# Patient Record
Sex: MALE | Race: OTHER | HISPANIC OR LATINO | Employment: UNEMPLOYED | URBAN - METROPOLITAN AREA
[De-identification: names, ages, dates, MRNs, and addresses within clinical notes are randomized per-mention and may not be internally consistent; named-entity substitution may affect disease eponyms.]

---

## 2021-09-22 ENCOUNTER — HOSPITAL ENCOUNTER (EMERGENCY)
Facility: HOSPITAL | Age: 31
Discharge: HOME | End: 2021-09-22
Attending: EMERGENCY MEDICINE

## 2021-09-22 VITALS
SYSTOLIC BLOOD PRESSURE: 125 MMHG | RESPIRATION RATE: 18 BRPM | DIASTOLIC BLOOD PRESSURE: 65 MMHG | HEART RATE: 61 BPM | WEIGHT: 135 LBS | OXYGEN SATURATION: 98 % | BODY MASS INDEX: 23.92 KG/M2 | HEIGHT: 63 IN | TEMPERATURE: 98 F

## 2021-09-22 DIAGNOSIS — R07.9 CHEST PAIN: ICD-10-CM

## 2021-09-22 DIAGNOSIS — T14.8XXA MUSCLE STRAIN: Primary | ICD-10-CM

## 2021-09-22 PROCEDURE — 99283 EMERGENCY DEPT VISIT LOW MDM: CPT | Mod: 25

## 2021-09-22 PROCEDURE — 25000003 PHARM REV CODE 250: Performed by: EMERGENCY MEDICINE

## 2021-09-22 RX ADMIN — LIDOCAINE HYDROCHLORIDE: 20 SOLUTION ORAL; TOPICAL at 03:09

## 2021-10-12 ENCOUNTER — TELEPHONE (OUTPATIENT)
Dept: ADMINISTRATIVE | Facility: OTHER | Age: 31
End: 2021-10-12

## 2022-05-20 ENCOUNTER — HOSPITAL ENCOUNTER (EMERGENCY)
Facility: HOSPITAL | Age: 32
Discharge: HOME | End: 2022-05-20
Attending: EMERGENCY MEDICINE
Payer: COMMERCIAL

## 2022-05-20 VITALS
RESPIRATION RATE: 20 BRPM | SYSTOLIC BLOOD PRESSURE: 99 MMHG | BODY MASS INDEX: 23.17 KG/M2 | HEIGHT: 64 IN | HEART RATE: 77 BPM | TEMPERATURE: 99 F | OXYGEN SATURATION: 97 % | DIASTOLIC BLOOD PRESSURE: 56 MMHG

## 2022-05-20 DIAGNOSIS — M48.02 CERVICAL STENOSIS OF SPINAL CANAL: ICD-10-CM

## 2022-05-20 DIAGNOSIS — R91.1 PULMONARY NODULE: ICD-10-CM

## 2022-05-20 DIAGNOSIS — F10.920 ALCOHOLIC INTOXICATION WITHOUT COMPLICATION: ICD-10-CM

## 2022-05-20 DIAGNOSIS — R93.89 ABNORMAL CT OF THE CHEST: ICD-10-CM
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
## 2022-05-20 LAB
ABO + RH BLD: NORMAL
ALBUMIN SERPL BCP-MCNC: 4.1 G/DL (ref 3.5–5.2)
ALP SERPL-CCNC: 54 U/L (ref 55–135)
ALT SERPL W/O P-5'-P-CCNC: 32 U/L (ref 10–44)
ANION GAP SERPL CALC-SCNC: 10 MMOL/L (ref 8–16)
AST SERPL-CCNC: 31 U/L (ref 10–40)
BASOPHILS # BLD AUTO: 0.02 K/UL (ref 0–0.2)
BASOPHILS NFR BLD: 0.2 % (ref 0–1.9)
BILIRUB SERPL-MCNC: 0.6 MG/DL (ref 0.1–1)
BLD GP AB SCN CELLS X3 SERPL QL: NORMAL
BUN SERPL-MCNC: 11 MG/DL (ref 6–20)
CALCIUM SERPL-MCNC: 8.7 MG/DL (ref 8.7–10.5)
CHLORIDE SERPL-SCNC: 110 MMOL/L (ref 95–110)
CO2 SERPL-SCNC: 21 MMOL/L (ref 23–29)
CREAT SERPL-MCNC: 0.6 MG/DL (ref 0.5–1.4)
DIFFERENTIAL METHOD: ABNORMAL
EOSINOPHIL # BLD AUTO: 0 K/UL (ref 0–0.5)
EOSINOPHIL NFR BLD: 0.4 % (ref 0–8)
ERYTHROCYTE [DISTWIDTH] IN BLOOD BY AUTOMATED COUNT: 12.6 % (ref 11.5–14.5)
EST. GFR  (AFRICAN AMERICAN): >60 ML/MIN/1.73 M^2
EST. GFR  (NON AFRICAN AMERICAN): >60 ML/MIN/1.73 M^2
ETHANOL SERPL-MCNC: 80 MG/DL
GLUCOSE SERPL-MCNC: 95 MG/DL (ref 70–110)
HCT VFR BLD AUTO: 41.4 % (ref 40–54)
HGB BLD-MCNC: 14.3 G/DL (ref 14–18)
IMM GRANULOCYTES # BLD AUTO: 0.03 K/UL (ref 0–0.04)
IMM GRANULOCYTES NFR BLD AUTO: 0.3 % (ref 0–0.5)
LYMPHOCYTES # BLD AUTO: 1.1 K/UL (ref 1–4.8)
LYMPHOCYTES NFR BLD: 12 % (ref 18–48)
MCH RBC QN AUTO: 30.8 PG (ref 27–31)
MCHC RBC AUTO-ENTMCNC: 34.5 G/DL (ref 32–36)
MCV RBC AUTO: 89 FL (ref 82–98)
MONOCYTES # BLD AUTO: 0.5 K/UL (ref 0.3–1)
MONOCYTES NFR BLD: 5 % (ref 4–15)
NEUTROPHILS # BLD AUTO: 7.8 K/UL (ref 1.8–7.7)
NEUTROPHILS NFR BLD: 82.1 % (ref 38–73)
NRBC BLD-RTO: 0 /100 WBC
PLATELET # BLD AUTO: 305 K/UL (ref 150–450)
PMV BLD AUTO: 9.7 FL (ref 9.2–12.9)
POTASSIUM SERPL-SCNC: 3.8 MMOL/L (ref 3.5–5.1)
PROT SERPL-MCNC: 7.4 G/DL (ref 6–8.4)
RBC # BLD AUTO: 4.64 M/UL (ref 4.6–6.2)
SODIUM SERPL-SCNC: 141 MMOL/L (ref 136–145)
WBC # BLD AUTO: 9.49 K/UL (ref 3.9–12.7)

## 2022-05-20 PROCEDURE — 82077 ASSAY SPEC XCP UR&BREATH IA: CPT | Performed by: EMERGENCY MEDICINE

## 2022-05-20 PROCEDURE — 86901 BLOOD TYPING SEROLOGIC RH(D): CPT | Performed by: EMERGENCY MEDICINE

## 2022-05-20 PROCEDURE — 99284 EMERGENCY DEPT VISIT MOD MDM: CPT | Mod: ,,, | Performed by: EMERGENCY MEDICINE

## 2022-05-20 PROCEDURE — 80053 COMPREHEN METABOLIC PANEL: CPT | Performed by: EMERGENCY MEDICINE

## 2022-05-20 PROCEDURE — 99284 PR EMERGENCY DEPT VISIT,LEVEL IV: ICD-10-PCS | Mod: ,,, | Performed by: EMERGENCY MEDICINE

## 2022-05-20 PROCEDURE — 85025 COMPLETE CBC W/AUTO DIFF WBC: CPT | Performed by: EMERGENCY MEDICINE

## 2022-05-20 PROCEDURE — 99284 EMERGENCY DEPT VISIT MOD MDM: CPT | Mod: 25

## 2022-05-20 RX ORDER — IBUPROFEN 600 MG/1
600 TABLET ORAL EVERY 6 HOURS PRN
Qty: 20 TABLET | Refills: 0 | Status: SHIPPED | OUTPATIENT
Start: 2022-05-20 | End: 2022-05-25
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
## 2022-06-07 ENCOUNTER — HOSPITAL ENCOUNTER (OUTPATIENT)
Facility: HOSPITAL | Age: 32
Discharge: HOME | End: 2022-06-08
Attending: EMERGENCY MEDICINE | Admitting: EMERGENCY MEDICINE

## 2022-06-07 DIAGNOSIS — R40.20 LOC (LOSS OF CONSCIOUSNESS): Primary | ICD-10-CM

## 2022-06-07 DIAGNOSIS — R07.9 CHEST PAIN: ICD-10-CM

## 2022-06-07 DIAGNOSIS — R41.82 ALTERED MENTAL STATUS: ICD-10-CM

## 2022-06-07 DIAGNOSIS — R55 SYNCOPE: ICD-10-CM

## 2022-06-07 LAB
ALBUMIN SERPL BCP-MCNC: 4.2 G/DL (ref 3.5–5.2)
ALP SERPL-CCNC: 55 U/L (ref 55–135)
ALT SERPL W/O P-5'-P-CCNC: 31 U/L (ref 10–44)
AMPHET+METHAMPHET UR QL: NEGATIVE
ANION GAP SERPL CALC-SCNC: 14 MMOL/L (ref 8–16)
ASCENDING AORTA: 2.64 CM
AST SERPL-CCNC: 30 U/L (ref 10–40)
AV INDEX (PROSTH): 0.7
AV MEAN GRADIENT: 3 MMHG
AV PEAK GRADIENT: 5 MMHG
AV VALVE AREA: 2.27 CM2
AV VELOCITY RATIO: 0.77
BARBITURATES UR QL SCN>200 NG/ML: NEGATIVE
BASOPHILS # BLD AUTO: 0.03 K/UL (ref 0–0.2)
BASOPHILS NFR BLD: 0.5 % (ref 0–1.9)
BENZODIAZ UR QL SCN>200 NG/ML: NEGATIVE
BILIRUB SERPL-MCNC: 0.4 MG/DL (ref 0.1–1)
BILIRUB UR QL STRIP: NEGATIVE
BSA FOR ECHO PROCEDURE: 1.66 M2
BUN SERPL-MCNC: 8 MG/DL (ref 6–20)
BZE UR QL SCN: NEGATIVE
CALCIUM SERPL-MCNC: 9.8 MG/DL (ref 8.7–10.5)
CANNABINOIDS UR QL SCN: NEGATIVE
CHLORIDE SERPL-SCNC: 105 MMOL/L (ref 95–110)
CK SERPL-CCNC: 334 U/L (ref 20–200)
CLARITY UR REFRACT.AUTO: CLEAR
CO2 SERPL-SCNC: 20 MMOL/L (ref 23–29)
COLOR UR AUTO: COLORLESS
CREAT SERPL-MCNC: 0.6 MG/DL (ref 0.5–1.4)
CREAT UR-MCNC: 7 MG/DL (ref 23–375)
CRP SERPL-MCNC: 0.5 MG/L (ref 0–8.2)
CTP QC/QA: YES
CV ECHO LV RWT: 0.39 CM
DIFFERENTIAL METHOD: NORMAL
DOP CALC AO PEAK VEL: 1.07 M/S
DOP CALC AO VTI: 23.79 CM
DOP CALC LVOT AREA: 3.2 CM2
DOP CALC LVOT DIAMETER: 2.03 CM
DOP CALC LVOT PEAK VEL: 0.82 M/S
DOP CALC LVOT STROKE VOLUME: 54.09 CM3
DOP CALCLVOT PEAK VEL VTI: 16.72 CM
E WAVE DECELERATION TIME: 250.43 MSEC
E/A RATIO: 1.47
E/E' RATIO: 5.75 M/S
ECHO LV POSTERIOR WALL: 0.89 CM (ref 0.6–1.1)
EJECTION FRACTION: 60 %
EOSINOPHIL # BLD AUTO: 0.2 K/UL (ref 0–0.5)
EOSINOPHIL NFR BLD: 2.5 % (ref 0–8)
ERYTHROCYTE [DISTWIDTH] IN BLOOD BY AUTOMATED COUNT: 12.6 % (ref 11.5–14.5)
EST. GFR  (AFRICAN AMERICAN): >60 ML/MIN/1.73 M^2
EST. GFR  (NON AFRICAN AMERICAN): >60 ML/MIN/1.73 M^2
FRACTIONAL SHORTENING: 29 % (ref 28–44)
GLUCOSE SERPL-MCNC: 90 MG/DL (ref 70–110)
GLUCOSE SERPL-MCNC: 99 MG/DL (ref 70–110)
GLUCOSE UR QL STRIP: NEGATIVE
HCT VFR BLD AUTO: 40.4 % (ref 40–54)
HCV AB SERPL QL IA: NEGATIVE
HETEROPH AB SERPL QL IA: NEGATIVE
HGB BLD-MCNC: 14.4 G/DL (ref 14–18)
HGB UR QL STRIP: NEGATIVE
HIV 1+2 AB+HIV1 P24 AG SERPL QL IA: NEGATIVE
IMM GRANULOCYTES # BLD AUTO: 0.02 K/UL (ref 0–0.04)
IMM GRANULOCYTES NFR BLD AUTO: 0.3 % (ref 0–0.5)
INTERVENTRICULAR SEPTUM: 1.06 CM (ref 0.6–1.1)
KETONES UR QL STRIP: NEGATIVE
LA MAJOR: 4.33 CM
LA MINOR: 3.76 CM
LA WIDTH: 3.23 CM
LEFT ATRIUM SIZE: 3.07 CM
LEFT ATRIUM VOLUME INDEX: 20.4 ML/M2
LEFT ATRIUM VOLUME: 33.92 CM3
LEFT INTERNAL DIMENSION IN SYSTOLE: 3.26 CM (ref 2.1–4)
LEFT VENTRICLE DIASTOLIC VOLUME INDEX: 58.54 ML/M2
LEFT VENTRICLE DIASTOLIC VOLUME: 97.18 ML
LEFT VENTRICLE MASS INDEX: 92 G/M2
LEFT VENTRICLE SYSTOLIC VOLUME INDEX: 25.9 ML/M2
LEFT VENTRICLE SYSTOLIC VOLUME: 42.97 ML
LEFT VENTRICULAR INTERNAL DIMENSION IN DIASTOLE: 4.6 CM (ref 3.5–6)
LEFT VENTRICULAR MASS: 153.42 G
LEUKOCYTE ESTERASE UR QL STRIP: NEGATIVE
LV LATERAL E/E' RATIO: 4.93 M/S
LV SEPTAL E/E' RATIO: 6.9 M/S
LYMPHOCYTES # BLD AUTO: 1.7 K/UL (ref 1–4.8)
LYMPHOCYTES NFR BLD: 27.3 % (ref 18–48)
MCH RBC QN AUTO: 30.6 PG (ref 27–31)
MCHC RBC AUTO-ENTMCNC: 35.6 G/DL (ref 32–36)
MCV RBC AUTO: 86 FL (ref 82–98)
METHADONE UR QL SCN>300 NG/ML: NEGATIVE
MONOCYTES # BLD AUTO: 0.6 K/UL (ref 0.3–1)
MONOCYTES NFR BLD: 9.5 % (ref 4–15)
MV PEAK A VEL: 0.47 M/S
MV PEAK E VEL: 0.69 M/S
MV STENOSIS PRESSURE HALF TIME: 72.62 MS
MV VALVE AREA P 1/2 METHOD: 3.03 CM2
NEUTROPHILS # BLD AUTO: 3.7 K/UL (ref 1.8–7.7)
NEUTROPHILS NFR BLD: 59.9 % (ref 38–73)
NITRITE UR QL STRIP: NEGATIVE
NRBC BLD-RTO: 0 /100 WBC
OPIATES UR QL SCN: NEGATIVE
PCP UR QL SCN>25 NG/ML: NEGATIVE
PH UR STRIP: 6 [PH] (ref 5–8)
PISA TR MAX VEL: 2.23 M/S
PLATELET # BLD AUTO: 302 K/UL (ref 150–450)
PMV BLD AUTO: 9.3 FL (ref 9.2–12.9)
POTASSIUM SERPL-SCNC: 3.7 MMOL/L (ref 3.5–5.1)
PROT SERPL-MCNC: 7.5 G/DL (ref 6–8.4)
PROT UR QL STRIP: NEGATIVE
RA MAJOR: 4.48 CM
RA PRESSURE: 3 MMHG
RA WIDTH: 2.57 CM
RBC # BLD AUTO: 4.71 M/UL (ref 4.6–6.2)
RIGHT VENTRICULAR END-DIASTOLIC DIMENSION: 2.79 CM
SARS-COV-2 RDRP RESP QL NAA+PROBE: NEGATIVE
SINUS: 2.42 CM
SODIUM SERPL-SCNC: 139 MMOL/L (ref 136–145)
SP GR UR STRIP: 1 (ref 1–1.03)
STJ: 2.54 CM
TDI LATERAL: 0.14 M/S
TDI SEPTAL: 0.1 M/S
TDI: 0.12 M/S
TOXICOLOGY INFORMATION: ABNORMAL
TR MAX PG: 20 MMHG
TRICUSPID ANNULAR PLANE SYSTOLIC EXCURSION: 2.28 CM
TROPONIN I SERPL DL<=0.01 NG/ML-MCNC: <0.006 NG/ML (ref 0–0.03)
TSH SERPL DL<=0.005 MIU/L-ACNC: 1.03 UIU/ML (ref 0.4–4)
TV REST PULMONARY ARTERY PRESSURE: 23 MMHG
URN SPEC COLLECT METH UR: ABNORMAL
WBC # BLD AUTO: 6.11 K/UL (ref 3.9–12.7)

## 2022-06-07 PROCEDURE — U0002 COVID-19 LAB TEST NON-CDC: HCPCS | Performed by: STUDENT IN AN ORGANIZED HEALTH CARE EDUCATION/TRAINING PROGRAM

## 2022-06-07 PROCEDURE — 96374 THER/PROPH/DIAG INJ IV PUSH: CPT

## 2022-06-07 PROCEDURE — 95816 EEG AWAKE AND DROWSY: CPT

## 2022-06-07 PROCEDURE — 99285 EMERGENCY DEPT VISIT HI MDM: CPT | Mod: 25

## 2022-06-07 PROCEDURE — 86140 C-REACTIVE PROTEIN: CPT | Performed by: STUDENT IN AN ORGANIZED HEALTH CARE EDUCATION/TRAINING PROGRAM

## 2022-06-07 PROCEDURE — 81003 URINALYSIS AUTO W/O SCOPE: CPT | Mod: 59 | Performed by: STUDENT IN AN ORGANIZED HEALTH CARE EDUCATION/TRAINING PROGRAM

## 2022-06-07 PROCEDURE — 84484 ASSAY OF TROPONIN QUANT: CPT | Performed by: STUDENT IN AN ORGANIZED HEALTH CARE EDUCATION/TRAINING PROGRAM

## 2022-06-07 PROCEDURE — 82550 ASSAY OF CK (CPK): CPT | Performed by: STUDENT IN AN ORGANIZED HEALTH CARE EDUCATION/TRAINING PROGRAM

## 2022-06-07 PROCEDURE — 63600175 PHARM REV CODE 636 W HCPCS: Performed by: STUDENT IN AN ORGANIZED HEALTH CARE EDUCATION/TRAINING PROGRAM

## 2022-06-07 PROCEDURE — 99220 PR INITIAL OBSERVATION CARE,LEVL III: CPT | Mod: ,,, | Performed by: STUDENT IN AN ORGANIZED HEALTH CARE EDUCATION/TRAINING PROGRAM

## 2022-06-07 PROCEDURE — G0378 HOSPITAL OBSERVATION PER HR: HCPCS

## 2022-06-07 PROCEDURE — 93005 ELECTROCARDIOGRAM TRACING: CPT

## 2022-06-07 PROCEDURE — 93010 EKG 12-LEAD: ICD-10-PCS | Mod: ,,, | Performed by: INTERNAL MEDICINE

## 2022-06-07 PROCEDURE — 80307 DRUG TEST PRSMV CHEM ANLYZR: CPT | Performed by: STUDENT IN AN ORGANIZED HEALTH CARE EDUCATION/TRAINING PROGRAM

## 2022-06-07 PROCEDURE — 92610 EVALUATE SWALLOWING FUNCTION: CPT

## 2022-06-07 PROCEDURE — 84443 ASSAY THYROID STIM HORMONE: CPT | Performed by: STUDENT IN AN ORGANIZED HEALTH CARE EDUCATION/TRAINING PROGRAM

## 2022-06-07 PROCEDURE — 25000003 PHARM REV CODE 250: Performed by: STUDENT IN AN ORGANIZED HEALTH CARE EDUCATION/TRAINING PROGRAM

## 2022-06-07 PROCEDURE — 87389 HIV-1 AG W/HIV-1&-2 AB AG IA: CPT | Performed by: EMERGENCY MEDICINE

## 2022-06-07 PROCEDURE — 85025 COMPLETE CBC W/AUTO DIFF WBC: CPT | Performed by: STUDENT IN AN ORGANIZED HEALTH CARE EDUCATION/TRAINING PROGRAM

## 2022-06-07 PROCEDURE — 99285 EMERGENCY DEPT VISIT HI MDM: CPT | Mod: CS,,, | Performed by: EMERGENCY MEDICINE

## 2022-06-07 PROCEDURE — 97535 SELF CARE MNGMENT TRAINING: CPT

## 2022-06-07 PROCEDURE — 80053 COMPREHEN METABOLIC PANEL: CPT | Performed by: STUDENT IN AN ORGANIZED HEALTH CARE EDUCATION/TRAINING PROGRAM

## 2022-06-07 PROCEDURE — 86803 HEPATITIS C AB TEST: CPT | Performed by: EMERGENCY MEDICINE

## 2022-06-07 PROCEDURE — 95816 PR EEG,W/AWAKE & DROWSY RECORD: ICD-10-PCS | Mod: 26,,, | Performed by: PSYCHIATRY & NEUROLOGY

## 2022-06-07 PROCEDURE — 86308 HETEROPHILE ANTIBODY SCREEN: CPT | Performed by: STUDENT IN AN ORGANIZED HEALTH CARE EDUCATION/TRAINING PROGRAM

## 2022-06-07 PROCEDURE — 93010 ELECTROCARDIOGRAM REPORT: CPT | Mod: ,,, | Performed by: INTERNAL MEDICINE

## 2022-06-07 PROCEDURE — 99285 PR EMERGENCY DEPT VISIT,LEVEL V: ICD-10-PCS | Mod: CS,,, | Performed by: EMERGENCY MEDICINE

## 2022-06-07 PROCEDURE — 95816 EEG AWAKE AND DROWSY: CPT | Mod: 26,,, | Performed by: PSYCHIATRY & NEUROLOGY

## 2022-06-07 PROCEDURE — 99220 PR INITIAL OBSERVATION CARE,LEVL III: ICD-10-PCS | Mod: ,,, | Performed by: STUDENT IN AN ORGANIZED HEALTH CARE EDUCATION/TRAINING PROGRAM

## 2022-06-07 RX ORDER — NALOXONE HCL 0.4 MG/ML
0.02 VIAL (ML) INJECTION
Status: DISCONTINUED | OUTPATIENT
Start: 2022-06-07 | End: 2022-06-08 | Stop reason: HOSPADM

## 2022-06-07 RX ORDER — GLUCAGON 1 MG
1 KIT INJECTION
Status: DISCONTINUED | OUTPATIENT
Start: 2022-06-07 | End: 2022-06-08 | Stop reason: HOSPADM

## 2022-06-07 RX ORDER — ENOXAPARIN SODIUM 100 MG/ML
40 INJECTION SUBCUTANEOUS EVERY 24 HOURS
Status: CANCELLED | OUTPATIENT
Start: 2022-06-07

## 2022-06-07 RX ORDER — TALC
6 POWDER (GRAM) TOPICAL NIGHTLY PRN
Status: CANCELLED | OUTPATIENT
Start: 2022-06-07

## 2022-06-07 RX ORDER — ACETAMINOPHEN 325 MG/1
650 TABLET ORAL EVERY 8 HOURS PRN
Status: DISCONTINUED | OUTPATIENT
Start: 2022-06-07 | End: 2022-06-08 | Stop reason: HOSPADM

## 2022-06-07 RX ORDER — SODIUM CHLORIDE 0.9 % (FLUSH) 0.9 %
10 SYRINGE (ML) INJECTION
Status: CANCELLED | OUTPATIENT
Start: 2022-06-07

## 2022-06-07 RX ORDER — IBUPROFEN 200 MG
16 TABLET ORAL
Status: DISCONTINUED | OUTPATIENT
Start: 2022-06-07 | End: 2022-06-08 | Stop reason: HOSPADM

## 2022-06-07 RX ORDER — ONDANSETRON 2 MG/ML
4 INJECTION INTRAMUSCULAR; INTRAVENOUS EVERY 8 HOURS PRN
Status: DISCONTINUED | OUTPATIENT
Start: 2022-06-07 | End: 2022-06-08 | Stop reason: HOSPADM

## 2022-06-07 RX ORDER — POLYETHYLENE GLYCOL 3350 17 G/17G
17 POWDER, FOR SOLUTION ORAL 2 TIMES DAILY PRN
Status: DISCONTINUED | OUTPATIENT
Start: 2022-06-07 | End: 2022-06-08 | Stop reason: HOSPADM

## 2022-06-07 RX ORDER — IBUPROFEN 200 MG
24 TABLET ORAL
Status: DISCONTINUED | OUTPATIENT
Start: 2022-06-07 | End: 2022-06-08 | Stop reason: HOSPADM

## 2022-06-07 RX ORDER — KETOROLAC TROMETHAMINE 30 MG/ML
30 INJECTION, SOLUTION INTRAMUSCULAR; INTRAVENOUS ONCE
Status: COMPLETED | OUTPATIENT
Start: 2022-06-07 | End: 2022-06-07

## 2022-06-07 RX ADMIN — ACETAMINOPHEN 650 MG: 325 TABLET ORAL at 06:06

## 2022-06-07 RX ADMIN — KETOROLAC TROMETHAMINE 30 MG: 30 INJECTION, SOLUTION INTRAMUSCULAR at 05:06
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
## 2022-06-07 NOTE — PLAN OF CARE
Problem: SLP  Goal: SLP Goal  Description: Speech Language Pathology Goals  Goals expected to be met by 6/14:  1. Pt will participate in ongoing assessment of swallow function to determine safest and least restrictive diet.   Outcome: Ongoing, Progressing  SLP rec: pureed diet with thin liquids following strict aspiration precautions. Continue ST per POC.  6/7/2022

## 2022-06-07 NOTE — ED NOTES
"Pt resting comfortably in bed, nods "yes" to improved pain in neck/shoulders/chest and R hand after toradol.  Pt VSS, HR 55, RR even/unlabored.  Pt has delayed responses/asphagia, airway patent, pt in control of own secretions, passed nursing bedside swallow study.  Pt R hand pain with swollen joints, ROM limited due to pain/swelling.  Skin warm/dry, afebrile.  Pt voids per urinal.  Bed low/locked, siderails upx2, pt agrees to plan of care.  "

## 2022-06-07 NOTE — SUBJECTIVE & OBJECTIVE
No past medical history on file.    No past surgical history on file.    Review of patient's allergies indicates:  No Known Allergies    No current facility-administered medications on file prior to encounter.     No current outpatient medications on file prior to encounter.     Family History    None       Tobacco Use    Smoking status: Never Smoker    Smokeless tobacco: Never Used   Substance and Sexual Activity    Alcohol use: Not Currently    Drug use: Never    Sexual activity: Not on file     Review of Systems   Unable to perform ROS: Mental status change   Constitutional:  Positive for activity change.   HENT:          Pointing to cervical LNs   Cardiovascular:  Positive for chest pain.   Objective:     Vital Signs (Most Recent):  Temp: 98.6 °F (37 °C) (06/07/22 0112)  Pulse: 63 (06/07/22 0111)  Resp: 16 (06/07/22 0111)  BP: 135/78 (06/07/22 0111)  SpO2: 98 % (06/07/22 0111) Vital Signs (24h Range):  Temp:  [98.6 °F (37 °C)] 98.6 °F (37 °C)  Pulse:  [63] 63  Resp:  [16] 16  SpO2:  [98 %] 98 %  BP: (135)/(78) 135/78        There is no height or weight on file to calculate BMI.    Physical Exam  Vitals reviewed.   Constitutional:       General: He is not in acute distress.     Appearance: He is well-developed. He is not diaphoretic.   HENT:      Head: Normocephalic and atraumatic.      Nose: Nose normal. No congestion.      Mouth/Throat:      Comments: Mildly swollen cervical LNs  Eyes:      General: No scleral icterus.     Pupils: Pupils are equal, round, and reactive to light.   Neck:      Thyroid: No thyromegaly.   Cardiovascular:      Rate and Rhythm: Normal rate and regular rhythm.      Heart sounds: No murmur heard.  Pulmonary:      Effort: Pulmonary effort is normal.      Breath sounds: Normal breath sounds. No stridor. No wheezing or rales.   Abdominal:      General: There is no distension.      Palpations: Abdomen is soft.      Tenderness: There is abdominal tenderness (Mildy TTP but all around abdomen,  bladder as well).   Genitourinary:     Comments: Urine very clear, sp gravity 1.000  Musculoskeletal:         General: Deformity (B/L deformity of both hands, very significant for 31M) present. Normal range of motion.      Cervical back: Normal range of motion.      Right lower leg: No edema.      Left lower leg: No edema.   Skin:     General: Skin is warm.      Capillary Refill: Capillary refill takes less than 2 seconds.      Coloration: Skin is not jaundiced.      Findings: No bruising.   Neurological:      General: No focal deficit present.      Mental Status: He is alert and oriented to person, place, and time.      Cranial Nerves: No cranial nerve deficit.      Comments: Unable to speak at all for first couple hours, later regained speech         CRANIAL NERVES     CN III, IV, VI   Pupils are equal, round, and reactive to light.         Recent Results (from the past 24 hour(s))   Urinalysis, Reflex to Urine Culture Urine, Clean Catch    Collection Time: 06/07/22  2:01 AM    Specimen: Urine   Result Value Ref Range    Specimen UA Urine, Clean Catch     Color, UA Colorless (A) Yellow, Straw, [place]    Appearance, UA Clear Clear    pH, UA 6.0 5.0 - 8.0    Specific Gravity, UA 1.000 (A) 1.005 - 1.030    Protein, UA Negative Negative    Glucose, UA Negative Negative    Ketones, UA Negative Negative    Bilirubin (UA) Negative Negative    Occult Blood UA Negative Negative    Nitrite, UA Negative Negative    Leukocytes, UA Negative Negative   Drug screen panel, emergency    Collection Time: 06/07/22  2:01 AM   Result Value Ref Range    Benzodiazepines Negative Negative    Methadone metabolites Negative Negative    Cocaine (Metab.) Negative Negative    Opiate Scrn, Ur Negative Negative    Barbiturate Screen, Ur Negative Negative    Amphetamine Screen, Ur Negative Negative    THC Negative Negative    Phencyclidine Negative Negative    Creatinine, Urine 7.0 (L) 23.0 - 375.0 mg/dL    Toxicology Information SEE COMMENT     CBC auto differential    Collection Time: 06/07/22  2:18 AM   Result Value Ref Range    WBC 6.11 3.90 - 12.70 K/uL    RBC 4.71 4.60 - 6.20 M/uL    Hemoglobin 14.4 14.0 - 18.0 g/dL    Hematocrit 40.4 40.0 - 54.0 %    MCV 86 82 - 98 fL    MCH 30.6 27.0 - 31.0 pg    MCHC 35.6 32.0 - 36.0 g/dL    RDW 12.6 11.5 - 14.5 %    Platelets 302 150 - 450 K/uL    MPV 9.3 9.2 - 12.9 fL    Immature Granulocytes 0.3 0.0 - 0.5 %    Gran # (ANC) 3.7 1.8 - 7.7 K/uL    Immature Grans (Abs) 0.02 0.00 - 0.04 K/uL    Lymph # 1.7 1.0 - 4.8 K/uL    Mono # 0.6 0.3 - 1.0 K/uL    Eos # 0.2 0.0 - 0.5 K/uL    Baso # 0.03 0.00 - 0.20 K/uL    nRBC 0 0 /100 WBC    Gran % 59.9 38.0 - 73.0 %    Lymph % 27.3 18.0 - 48.0 %    Mono % 9.5 4.0 - 15.0 %    Eosinophil % 2.5 0.0 - 8.0 %    Basophil % 0.5 0.0 - 1.9 %    Differential Method Automated    Comprehensive metabolic panel    Collection Time: 06/07/22  2:18 AM   Result Value Ref Range    Sodium 139 136 - 145 mmol/L    Potassium 3.7 3.5 - 5.1 mmol/L    Chloride 105 95 - 110 mmol/L    CO2 20 (L) 23 - 29 mmol/L    Glucose 99 70 - 110 mg/dL    BUN 8 6 - 20 mg/dL    Creatinine 0.6 0.5 - 1.4 mg/dL    Calcium 9.8 8.7 - 10.5 mg/dL    Total Protein 7.5 6.0 - 8.4 g/dL    Albumin 4.2 3.5 - 5.2 g/dL    Total Bilirubin 0.4 0.1 - 1.0 mg/dL    Alkaline Phosphatase 55 55 - 135 U/L    AST 30 10 - 40 U/L    ALT 31 10 - 44 U/L    Anion Gap 14 8 - 16 mmol/L    eGFR if African American >60.0 >60 mL/min/1.73 m^2    eGFR if non African American >60.0 >60 mL/min/1.73 m^2   Troponin I    Collection Time: 06/07/22  2:18 AM   Result Value Ref Range    Troponin I <0.006 0.000 - 0.026 ng/mL   TSH    Collection Time: 06/07/22  2:18 AM   Result Value Ref Range    TSH 1.035 0.400 - 4.000 uIU/mL   C-Reactive Protein    Collection Time: 06/07/22  2:18 AM   Result Value Ref Range    CRP 0.5 0.0 - 8.2 mg/L   CK    Collection Time: 06/07/22  2:18 AM   Result Value Ref Range     (H) 20 - 200 U/L   POCT Glucose, Hand-Held Device     Collection Time: 06/07/22  2:30 AM   Result Value Ref Range    POC Glucose 90 70 - 110 MG/DL   POCT COVID-19 Rapid Screening    Collection Time: 06/07/22  4:58 AM   Result Value Ref Range    POC Rapid COVID Negative Negative     Acceptable Yes        Microbiology Results (last 7 days)       ** No results found for the last 168 hours. **             Imaging Results              X-Ray Hand 3 View Bilateral (Final result)  Result time 06/07/22 06:02:15   Procedure changed from X-Ray Hand 2 View Bilat     Final result by Koffi Brewer MD (06/07/22 06:02:15)                   Impression:      No definite radiographic evidence of acute displaced fracture or dislocation.  No aggressive periarticular erosive changes or severe joint space narrowing.  Further evaluation and follow-up as warranted in this patient with questioned inflammatory arthropathy.      Electronically signed by: Koffi Brewer MD  Date:    06/07/2022  Time:    06:02               Narrative:    EXAMINATION:  XR HAND COMPLETE 3 VIEWS BILATERAL    CLINICAL HISTORY:  Severe OA vs RA?;.    TECHNIQUE:  PA, lateral, and oblique views of both hands were performed.    COMPARISON:  None    FINDINGS:  Note evaluation is limited secondary to suboptimal positioning of the hands on the AP and oblique views.  Additionally, monitoring leads overlie the 3rd digit of the right hand and limits evaluation of the phalanges on the lateral view.    No definite acute displaced fracture appreciated of the right or left hands.  No britney dislocation appreciated allowing for limited positioning, particularly fixed flexion of the right hand at the level of the PIP joints.  No periarticular erosive changes or pencil in cup deformity appreciated to suggest an inflammatory arthropathy.  No definite retained foreign bodies appreciated within the soft hands.                                       CT Chest Abdomen Pelvis Without Contrast (XPD) (Final result)  Result  time 06/07/22 03:31:41      Final result by Donny Estes MD (06/07/22 03:31:41)                   Impression:      Marked distension of the urinary bladder.  Consider catheterization.    Otherwise, no acute abnormality within the chest, abdomen, or pelvis.    Electronically signed by resident: Grey Abdi  Date:    06/07/2022  Time:    03:11    Electronically signed by: Donny Estes MD  Date:    06/07/2022  Time:    03:31               Narrative:    EXAMINATION:  CT CHEST ABDOMEN PELVIS WITHOUT CONTRAST(XPD)    CLINICAL HISTORY:  Polytrauma, blunt;    TECHNIQUE:  Axial images of the chest, abdomen, and pelvis were acquired without IV contrast. Oral contrast was not administered.  Coronal and sagittal reconstructions were also obtained    COMPARISON:  CT chest abdomen pelvis 05/20/2022.    FINDINGS:  Thoracic soft tissues: Normal thyroid.    Aorta: Thoracic aorta is normal in caliber and contour with no significant calcific atherosclerosis.    Heart: Normal in size. No pericardial effusion. No significant calcific coronary atherosclerosis.    Vicky/Mediastinum: No significant mediastinal, hilar, or axillary lymphadenopathy    Lungs: Trachea and bronchi are patent.  Lungs are well aerated, without consolidation or pleural fluid.    Liver: Normal in size and contour.  No focal hepatic lesion.    Gallbladder: Contracted.    Bile Ducts: No evidence of dilated ducts.    Pancreas: No mass or peripancreatic fat stranding.    Spleen: Unremarkable.    Stomach and duodenum: Unremarkable.    Adrenals: Unremarkable.    Kidneys/ Ureters: Normal in size and location.  No hydronephrosis or nephrolithiasis. No ureteral dilatation.    Bladder: Bladder is markedly distended without evidence of wall thickening.    Reproductive organs: Unremarkable.    Bowel/Mesentery: Small bowel is normal in caliber with no evidence of obstruction.  No evidence of inflammation or wall thickening.  Colon demonstrates no focal wall  thickening.    Lymph nodes: No lymphadenapathy.    Abdominal wall:  Unremarkable.    Vasculature: No aneurysm. No significant calcific atherosclerosis.    Bones: No acute fracture. No suspicious osseous lesions.                                       CT Head Without Contrast (Final result)  Result time 06/07/22 02:50:01      Final result by Donny Estes MD (06/07/22 02:50:01)                   Impression:      No acute intracranial abnormalities      Electronically signed by: Donny Estes MD  Date:    06/07/2022  Time:    02:50               Narrative:    EXAMINATION:  CT HEAD WITHOUT CONTRAST    CLINICAL HISTORY:  Mental status change, unknown cause;    TECHNIQUE:  Low dose axial images were obtained through the head.  Coronal and sagittal reformations were also performed. Contrast was not administered.    COMPARISON:  05/20/2022.    FINDINGS:  The brain parenchyma appears normal for age with good corticomedullary differentiation.  There is no evidence of acute infarct, hemorrhage, or mass.  The ventricular system is normal in size.  No mass-effect or midline shift.  There are no abnormal extra-axial fluid collections.  The paranasal sinuses and mastoid air cells are clear.  The calvarium appears intact.  .                                       CT Soft Tissue Neck WO Contrast (Final result)  Result time 06/07/22 03:25:10      Final result by Donny Estes MD (06/07/22 03:25:10)                   Impression:      No radiopaque foreign body identified.    No acute fracture or traumatic malalignment of the cervical spine.    Electronically signed by resident: Grey Abdi  Date:    06/07/2022  Time:    02:49    Electronically signed by: Donny Estes MD  Date:    06/07/2022  Time:    03:25               Narrative:    EXAMINATION:  CT SOFT TISSUE NECK WITHOUT CONTRAST    CLINICAL HISTORY:  Foreign body suspected, neck, neg xray;    TECHNIQUE:  Axial CT images obtained throughout the region of the neck  without intravenous contrast. Axial, sagittal and coronal reconstructions were performed.    COMPARISON:  No priors.    FINDINGS:  Examination mildly degraded by patient motion artifact.    No abnormal soft tissue injury within the neck. There is no evidence of pathologic lymph node enlargement.    The soft tissues of the nasopharynx, oropharynx, hypopharynx and larynx are within normal limits.  Tiny calcified tonsilliths incidentally noted on the right.  The parotid, submandibular, and thyroid glands demonstrate nothing unusual.  There is some calcification in the laryngeal cartilage.    Patency of major vessels of the neck unable to be assessed on this non contrasted examination.  Limited intracranial evaluation is limited by motion artifact.  Mild patchy opacification of the bilateral ethmoidal air cells.  Lobulated opacification of the inferior right maxillary sinus, likely mucosal retention cyst.  Mastoid air cells are clear.    Lung apices demonstrate no pneumothorax.  Please see concomitant CT of the chest for detailed evaluation.    No acute fracture or traumatic malalignment of the cervical spine.  No spinal canal stenosis.                                       X-Ray Chest AP Portable (Final result)  Result time 06/07/22 02:24:59      Final result by Donny Estes MD (06/07/22 02:24:59)                   Impression:      No acute findings in the chest.      Electronically signed by: Donny Estes MD  Date:    06/07/2022  Time:    02:24               Narrative:    EXAMINATION:  XR CHEST AP PORTABLE    CLINICAL HISTORY:  Chest pain, unspecified    TECHNIQUE:  Single frontal view of the chest was performed.    COMPARISON:  09/22/2021.    FINDINGS:  No consolidation, pleural effusion or pneumothorax.    Cardiomediastinal silhouette is unremarkable.

## 2022-06-07 NOTE — ED PROVIDER NOTES
Encounter Date: 6/7/2022       History     Chief Complaint   Patient presents with    Altered Mental Status     Found at home, unresponsive to pain upon EMS arrival, 1 mg IV narcan given without response. Upon arrival to ED, pt responsive to voice but no verbal response. Ambulated with assistance with EMS. Per family, no ETOH     HPI  Review of patient's allergies indicates:  No Known Allergies  No past medical history on file.  No past surgical history on file.  No family history on file.  Social History     Tobacco Use    Smoking status: Never Smoker    Smokeless tobacco: Never Used   Substance Use Topics    Alcohol use: Not Currently    Drug use: Never     Review of Systems    Physical Exam     Initial Vitals   BP Pulse Resp Temp SpO2   06/07/22 0111 06/07/22 0111 06/07/22 0111 06/07/22 0112 06/07/22 0111   135/78 63 16 98.6 °F (37 °C) 98 %      MAP       --                Physical Exam    ED Course   Procedures  Labs Reviewed   COMPREHENSIVE METABOLIC PANEL - Abnormal; Notable for the following components:       Result Value    CO2 20 (*)     All other components within normal limits    Narrative:     add on tsh per dr dylan nair/order#[id number] @02:32 06/07/2022              Release to patient->Immediate   URINALYSIS, REFLEX TO URINE CULTURE - Abnormal; Notable for the following components:    Color, UA Colorless (*)     Specific Wilson, UA 1.000 (*)     All other components within normal limits    Narrative:     Specimen Source->Urine   DRUG SCREEN PANEL, URINE EMERGENCY - Abnormal; Notable for the following components:    Creatinine, Urine 7.0 (*)     All other components within normal limits    Narrative:     Specimen Source->Urine   CK - Abnormal; Notable for the following components:     (*)     All other components within normal limits    Narrative:     add on ck per dr preston madera/order#[id number] @04;51 06/07/2022      add on tsh per dr dylan nair/order#[id number] @02:32 06/07/2022/  Add  on crp per preston madera/order#[id number] @04:42 06/07/2022              Release to patient->Immediate   CBC W/ AUTO DIFFERENTIAL    Narrative:     Release to patient->Immediate   TROPONIN I   TSH   TSH    Narrative:     add on tsh per dr dylan nair/order#[id number] @02:32 06/07/2022              Release to patient->Immediate   C-REACTIVE PROTEIN   C-REACTIVE PROTEIN    Narrative:     add on ck per dr preston madera/order#[id number] @04;51 06/07/2022      add on tsh per dr dylan nair/order#[id number] @02:32 06/07/2022/  Add on crp per preston bell/order#[id number] @04:42 06/07/2022              Release to patient->Immediate   CK   HIV 1 / 2 ANTIBODY   HEPATITIS C ANTIBODY   HETEROPHILE AB SCREEN   SARS-COV-2 RDRP GENE    Narrative:     This test utilizes isothermal nucleic acid amplification   technology to detect the SARS-CoV-2 RdRp nucleic acid segment.   The analytical sensitivity (limit of detection) is 125 genome   equivalents/mL.   A POSITIVE result implies infection with the SARS-CoV-2 virus;   the patient is presumed to be contagious.     A NEGATIVE result means that SARS-CoV-2 nucleic acids are not   present above the limit of detection. A NEGATIVE result should be   treated as presumptive. It does not rule out the possibility of   COVID-19 and should not be the sole basis for treatment decisions.   If COVID-19 is strongly suspected based on clinical and exposure   history, re-testing using an alternate molecular assay should be   considered.   This test is only for use under the Food and Drug   Administration s Emergency Use Authorization (EUA).   Commercial kits are provided by [employer].   Performance characteristics of the EUA have been independently   verified by [facility] Department of   Pathology and Laboratory Medicine.   _________________________________________________________________   The authorized Fact Sheet for Healthcare Providers and the authorized Fact   Sheet for Patients of  the ID NOW COVID-19 are available on the FDA   website:     https://www.fda.gov/media/697911/download  https://www.fda.gov/media/606315/download   POCT GLUCOSE MONITORING CONTINUOUS          Imaging Results          X-Ray Hand 3 View Bilateral (In process)  Result time 06/07/22 06:02:17   Procedure changed from X-Ray Hand 2 View Bilat                CT Chest Abdomen Pelvis Without Contrast (XPD) (Final result)  Result time 06/07/22 03:31:41    Final result by Donny Estes MD (06/07/22 03:31:41)                 Impression:      Marked distension of the urinary bladder.  Consider catheterization.    Otherwise, no acute abnormality within the chest, abdomen, or pelvis.    Electronically signed by resident: Grey Abdi  Date:    06/07/2022  Time:    03:11    Electronically signed by: Donny Estes MD  Date:    06/07/2022  Time:    03:31             Narrative:    EXAMINATION:  CT CHEST ABDOMEN PELVIS WITHOUT CONTRAST(XPD)    CLINICAL HISTORY:  Polytrauma, blunt;    TECHNIQUE:  Axial images of the chest, abdomen, and pelvis were acquired without IV contrast. Oral contrast was not administered.  Coronal and sagittal reconstructions were also obtained    COMPARISON:  CT chest abdomen pelvis 05/20/2022.    FINDINGS:  Thoracic soft tissues: Normal thyroid.    Aorta: Thoracic aorta is normal in caliber and contour with no significant calcific atherosclerosis.    Heart: Normal in size. No pericardial effusion. No significant calcific coronary atherosclerosis.    Vicky/Mediastinum: No significant mediastinal, hilar, or axillary lymphadenopathy    Lungs: Trachea and bronchi are patent.  Lungs are well aerated, without consolidation or pleural fluid.    Liver: Normal in size and contour.  No focal hepatic lesion.    Gallbladder: Contracted.    Bile Ducts: No evidence of dilated ducts.    Pancreas: No mass or peripancreatic fat stranding.    Spleen: Unremarkable.    Stomach and duodenum: Unremarkable.    Adrenals:  Unremarkable.    Kidneys/ Ureters: Normal in size and location.  No hydronephrosis or nephrolithiasis. No ureteral dilatation.    Bladder: Bladder is markedly distended without evidence of wall thickening.    Reproductive organs: Unremarkable.    Bowel/Mesentery: Small bowel is normal in caliber with no evidence of obstruction.  No evidence of inflammation or wall thickening.  Colon demonstrates no focal wall thickening.    Lymph nodes: No lymphadenapathy.    Abdominal wall:  Unremarkable.    Vasculature: No aneurysm. No significant calcific atherosclerosis.    Bones: No acute fracture. No suspicious osseous lesions.                               CT Head Without Contrast (Final result)  Result time 06/07/22 02:50:01    Final result by Donny Estes MD (06/07/22 02:50:01)                 Impression:      No acute intracranial abnormalities      Electronically signed by: Donny Estes MD  Date:    06/07/2022  Time:    02:50             Narrative:    EXAMINATION:  CT HEAD WITHOUT CONTRAST    CLINICAL HISTORY:  Mental status change, unknown cause;    TECHNIQUE:  Low dose axial images were obtained through the head.  Coronal and sagittal reformations were also performed. Contrast was not administered.    COMPARISON:  05/20/2022.    FINDINGS:  The brain parenchyma appears normal for age with good corticomedullary differentiation.  There is no evidence of acute infarct, hemorrhage, or mass.  The ventricular system is normal in size.  No mass-effect or midline shift.  There are no abnormal extra-axial fluid collections.  The paranasal sinuses and mastoid air cells are clear.  The calvarium appears intact.  .                               CT Soft Tissue Neck WO Contrast (Final result)  Result time 06/07/22 03:25:10    Final result by Donny Estes MD (06/07/22 03:25:10)                 Impression:      No radiopaque foreign body identified.    No acute fracture or traumatic malalignment of the cervical  spine.    Electronically signed by resident: Grey Abdi  Date:    06/07/2022  Time:    02:49    Electronically signed by: Donny Estes MD  Date:    06/07/2022  Time:    03:25             Narrative:    EXAMINATION:  CT SOFT TISSUE NECK WITHOUT CONTRAST    CLINICAL HISTORY:  Foreign body suspected, neck, neg xray;    TECHNIQUE:  Axial CT images obtained throughout the region of the neck without intravenous contrast. Axial, sagittal and coronal reconstructions were performed.    COMPARISON:  No priors.    FINDINGS:  Examination mildly degraded by patient motion artifact.    No abnormal soft tissue injury within the neck. There is no evidence of pathologic lymph node enlargement.    The soft tissues of the nasopharynx, oropharynx, hypopharynx and larynx are within normal limits.  Tiny calcified tonsilliths incidentally noted on the right.  The parotid, submandibular, and thyroid glands demonstrate nothing unusual.  There is some calcification in the laryngeal cartilage.    Patency of major vessels of the neck unable to be assessed on this non contrasted examination.  Limited intracranial evaluation is limited by motion artifact.  Mild patchy opacification of the bilateral ethmoidal air cells.  Lobulated opacification of the inferior right maxillary sinus, likely mucosal retention cyst.  Mastoid air cells are clear.    Lung apices demonstrate no pneumothorax.  Please see concomitant CT of the chest for detailed evaluation.    No acute fracture or traumatic malalignment of the cervical spine.  No spinal canal stenosis.                               X-Ray Chest AP Portable (Final result)  Result time 06/07/22 02:24:59    Final result by Donny Estes MD (06/07/22 02:24:59)                 Impression:      No acute findings in the chest.      Electronically signed by: Donny Estes MD  Date:    06/07/2022  Time:    02:24             Narrative:    EXAMINATION:  XR CHEST AP PORTABLE    CLINICAL HISTORY:  Chest  pain, unspecified    TECHNIQUE:  Single frontal view of the chest was performed.    COMPARISON:  09/22/2021.    FINDINGS:  No consolidation, pleural effusion or pneumothorax.    Cardiomediastinal silhouette is unremarkable.                                 Medications   polyethylene glycol packet 17 g (has no administration in time range)   acetaminophen tablet 650 mg (has no administration in time range)   naloxone 0.4 mg/mL injection 0.02 mg (has no administration in time range)   glucose chewable tablet 16 g (has no administration in time range)   glucose chewable tablet 24 g (has no administration in time range)   glucagon (human recombinant) injection 1 mg (has no administration in time range)   ondansetron injection 4 mg (has no administration in time range)   ketorolac injection 30 mg (30 mg Intravenous Given 6/7/22 0552)                Attending Attestation:   Physician Attestation Statement for Resident:  As the supervising MD   Physician Attestation Statement: I have personally seen and examined this patient.   I agree with the above history. -:   As the supervising MD I agree with the above PE.    As the supervising MD I agree with the above treatment, course, plan, and disposition.   -: 31-year-old male with unknown past medical history brought in for altered mental status.  On exam patient is alert, opens mouth to answer questions but makes no sound.  Points to throat, left chest, and lower abdomen when asked about pain, and he is TTP (grimacing) in these regions as well.  As patient is poor historian, will obtain imaging for further evaluation.                ED Course as of 06/07/22 0603   Tue Jun 07, 2022   0308 Comprehensive metabolic panel(!) [JR]   0308 Troponin I [JR]   0308 CBC auto differential [JR]      ED Course User Index  [JR] Denise Cervantes MD             Clinical Impression:   Final diagnoses:  [R41.82] Altered mental status  [R07.9] Chest pain          ED Disposition Condition     Observation

## 2022-06-07 NOTE — H&P
[facility] Emergency Dept  [facility] Medicine  History & Physical    Patient Name: Jean Paul Mir  MRN: [id number]  Patient Class: OP- Observation  Admission Date: 6/7/2022  Attending Physician: Denise Cervantes MD   Primary Care Provider: Primary Doctor No         Patient information was obtained from patient and ER records.     Subjective:     Principal Problem:LOC (loss of consciousness)    Chief Complaint:   Chief Complaint   Patient presents with    Altered Mental Status     Found at home, unresponsive to pain upon EMS arrival, 1 mg IV narcan given without response. Upon arrival to ED, pt responsive to voice but no verbal response. Ambulated with assistance with EMS. Per family, no ETOH        HPI:   Jean Paul Mir is a 31 y.o. male who has no past medical history on file, presented to the ED after being found down unresponsive to pain by EMS. Patient was unable to talk, mute, he was using his hands to gesture correctly but was unable to speak to being. Patients tox screen was negative. . Denies any elicit drug use or ingestion of anything. Does not remember the event. He only complains of L chest discomfort that extends to his L shoulder, and LN soreness b/l of throat. Denies known medical conditions or daily meds. He is able to talk after some time. No known hx of seizures. Wife is also Polish speaking, ED talked to her but was unable to get anything useful for diagnosis as of yet. 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 Patient underwent CTH, CT neck, and CT a/c/p, which we are largely unremarkable as well as his labs. He did have enlarged bladder but was urinating. And his specific gravity was very low, his urine looked as clear as water.         No past medical history on file.    No past surgical history on file.    Review of patient's allergies indicates:  No Known  Allergies    No current facility-administered medications on file prior to encounter.     No current outpatient medications on file prior to encounter.     Family History    None       Tobacco Use    Smoking status: Never Smoker    Smokeless tobacco: Never Used   Substance and Sexual Activity    Alcohol use: Not Currently    Drug use: Never    Sexual activity: Not on file     Review of Systems   Unable to perform ROS: Mental status change   Constitutional:  Positive for activity change.   HENT:          Pointing to cervical LNs   Cardiovascular:  Positive for chest pain.   Objective:     Vital Signs (Most Recent):  Temp: 98.6 °F (37 °C) (06/07/22 0112)  Pulse: 63 (06/07/22 0111)  Resp: 16 (06/07/22 0111)  BP: 135/78 (06/07/22 0111)  SpO2: 98 % (06/07/22 0111) Vital Signs (24h Range):  Temp:  [98.6 °F (37 °C)] 98.6 °F (37 °C)  Pulse:  [63] 63  Resp:  [16] 16  SpO2:  [98 %] 98 %  BP: (135)/(78) 135/78        There is no height or weight on file to calculate BMI.    Physical Exam  Vitals reviewed.   Constitutional:       General: He is not in acute distress.     Appearance: He is well-developed. He is not diaphoretic.   HENT:      Head: Normocephalic and atraumatic.      Nose: Nose normal. No congestion.      Mouth/Throat:      Comments: Mildly swollen cervical LNs  Eyes:      General: No scleral icterus.     Pupils: Pupils are equal, round, and reactive to light.   Neck:      Thyroid: No thyromegaly.   Cardiovascular:      Rate and Rhythm: Normal rate and regular rhythm.      Heart sounds: No murmur heard.  Pulmonary:      Effort: Pulmonary effort is normal.      Breath sounds: Normal breath sounds. No stridor. No wheezing or rales.   Abdominal:      General: There is no distension.      Palpations: Abdomen is soft.      Tenderness: There is abdominal tenderness (Mildy TTP but all around abdomen, bladder as well).   Genitourinary:     Comments: Urine very clear, sp gravity 1.000  Musculoskeletal:         General:  Deformity (B/L deformity of both hands, very significant for 31M) present. Normal range of motion.      Cervical back: Normal range of motion.      Right lower leg: No edema.      Left lower leg: No edema.   Skin:     General: Skin is warm.      Capillary Refill: Capillary refill takes less than 2 seconds.      Coloration: Skin is not jaundiced.      Findings: No bruising.   Neurological:      General: No focal deficit present.      Mental Status: He is alert and oriented to person, place, and time.      Cranial Nerves: No cranial nerve deficit.      Comments: Unable to speak at all for first couple hours, later regained speech         CRANIAL NERVES     CN III, IV, VI   Pupils are equal, round, and reactive to light.         Recent Results (from the past 24 hour(s))   Urinalysis, Reflex to Urine Culture Urine, Clean Catch    Collection Time: 06/07/22  2:01 AM    Specimen: Urine   Result Value Ref Range    Specimen UA Urine, Clean Catch     Color, UA Colorless (A) Yellow, Straw, [place]    Appearance, UA Clear Clear    pH, UA 6.0 5.0 - 8.0    Specific Gravity, UA 1.000 (A) 1.005 - 1.030    Protein, UA Negative Negative    Glucose, UA Negative Negative    Ketones, UA Negative Negative    Bilirubin (UA) Negative Negative    Occult Blood UA Negative Negative    Nitrite, UA Negative Negative    Leukocytes, UA Negative Negative   Drug screen panel, emergency    Collection Time: 06/07/22  2:01 AM   Result Value Ref Range    Benzodiazepines Negative Negative    Methadone metabolites Negative Negative    Cocaine (Metab.) Negative Negative    Opiate Scrn, Ur Negative Negative    Barbiturate Screen, Ur Negative Negative    Amphetamine Screen, Ur Negative Negative    THC Negative Negative    Phencyclidine Negative Negative    Creatinine, Urine 7.0 (L) 23.0 - 375.0 mg/dL    Toxicology Information SEE COMMENT    CBC auto differential    Collection Time: 06/07/22  2:18 AM   Result Value Ref Range    WBC 6.11 3.90 - 12.70 K/uL     RBC 4.71 4.60 - 6.20 M/uL    Hemoglobin 14.4 14.0 - 18.0 g/dL    Hematocrit 40.4 40.0 - 54.0 %    MCV 86 82 - 98 fL    MCH 30.6 27.0 - 31.0 pg    MCHC 35.6 32.0 - 36.0 g/dL    RDW 12.6 11.5 - 14.5 %    Platelets 302 150 - 450 K/uL    MPV 9.3 9.2 - 12.9 fL    Immature Granulocytes 0.3 0.0 - 0.5 %    Gran # (ANC) 3.7 1.8 - 7.7 K/uL    Immature Grans (Abs) 0.02 0.00 - 0.04 K/uL    Lymph # 1.7 1.0 - 4.8 K/uL    Mono # 0.6 0.3 - 1.0 K/uL    Eos # 0.2 0.0 - 0.5 K/uL    Baso # 0.03 0.00 - 0.20 K/uL    nRBC 0 0 /100 WBC    Gran % 59.9 38.0 - 73.0 %    Lymph % 27.3 18.0 - 48.0 %    Mono % 9.5 4.0 - 15.0 %    Eosinophil % 2.5 0.0 - 8.0 %    Basophil % 0.5 0.0 - 1.9 %    Differential Method Automated    Comprehensive metabolic panel    Collection Time: 06/07/22  2:18 AM   Result Value Ref Range    Sodium 139 136 - 145 mmol/L    Potassium 3.7 3.5 - 5.1 mmol/L    Chloride 105 95 - 110 mmol/L    CO2 20 (L) 23 - 29 mmol/L    Glucose 99 70 - 110 mg/dL    BUN 8 6 - 20 mg/dL    Creatinine 0.6 0.5 - 1.4 mg/dL    Calcium 9.8 8.7 - 10.5 mg/dL    Total Protein 7.5 6.0 - 8.4 g/dL    Albumin 4.2 3.5 - 5.2 g/dL    Total Bilirubin 0.4 0.1 - 1.0 mg/dL    Alkaline Phosphatase 55 55 - 135 U/L    AST 30 10 - 40 U/L    ALT 31 10 - 44 U/L    Anion Gap 14 8 - 16 mmol/L    eGFR if African American >60.0 >60 mL/min/1.73 m^2    eGFR if non African American >60.0 >60 mL/min/1.73 m^2   Troponin I    Collection Time: 06/07/22  2:18 AM   Result Value Ref Range    Troponin I <0.006 0.000 - 0.026 ng/mL   TSH    Collection Time: 06/07/22  2:18 AM   Result Value Ref Range    TSH 1.035 0.400 - 4.000 uIU/mL   C-Reactive Protein    Collection Time: 06/07/22  2:18 AM   Result Value Ref Range    CRP 0.5 0.0 - 8.2 mg/L   CK    Collection Time: 06/07/22  2:18 AM   Result Value Ref Range     (H) 20 - 200 U/L   POCT Glucose, Hand-Held Device    Collection Time: 06/07/22  2:30 AM   Result Value Ref Range    POC Glucose 90 70 - 110 MG/DL   POCT COVID-19 Rapid  Screening    Collection Time: 06/07/22  4:58 AM   Result Value Ref Range    POC Rapid COVID Negative Negative     Acceptable Yes        Microbiology Results (last 7 days)       ** No results found for the last 168 hours. **             Imaging Results              X-Ray Hand 3 View Bilateral (Final result)  Result time 06/07/22 06:02:15   Procedure changed from X-Ray Hand 2 View Bilat     Final result by Koffi Brewer MD (06/07/22 06:02:15)                   Impression:      No definite radiographic evidence of acute displaced fracture or dislocation.  No aggressive periarticular erosive changes or severe joint space narrowing.  Further evaluation and follow-up as warranted in this patient with questioned inflammatory arthropathy.      Electronically signed by: Koffi Brewer MD  Date:    06/07/2022  Time:    06:02               Narrative:    EXAMINATION:  XR HAND COMPLETE 3 VIEWS BILATERAL    CLINICAL HISTORY:  Severe OA vs RA?;.    TECHNIQUE:  PA, lateral, and oblique views of both hands were performed.    COMPARISON:  None    FINDINGS:  Note evaluation is limited secondary to suboptimal positioning of the hands on the AP and oblique views.  Additionally, monitoring leads overlie the 3rd digit of the right hand and limits evaluation of the phalanges on the lateral view.    No definite acute displaced fracture appreciated of the right or left hands.  No britney dislocation appreciated allowing for limited positioning, particularly fixed flexion of the right hand at the level of the PIP joints.  No periarticular erosive changes or pencil in cup deformity appreciated to suggest an inflammatory arthropathy.  No definite retained foreign bodies appreciated within the soft hands.                                       CT Chest Abdomen Pelvis Without Contrast (XPD) (Final result)  Result time 06/07/22 03:31:41      Final result by Donny Estes MD (06/07/22 03:31:41)                   Impression:       Marked distension of the urinary bladder.  Consider catheterization.    Otherwise, no acute abnormality within the chest, abdomen, or pelvis.    Electronically signed by resident: Grey Abdi  Date:    06/07/2022  Time:    03:11    Electronically signed by: Donny Estes MD  Date:    06/07/2022  Time:    03:31               Narrative:    EXAMINATION:  CT CHEST ABDOMEN PELVIS WITHOUT CONTRAST(XPD)    CLINICAL HISTORY:  Polytrauma, blunt;    TECHNIQUE:  Axial images of the chest, abdomen, and pelvis were acquired without IV contrast. Oral contrast was not administered.  Coronal and sagittal reconstructions were also obtained    COMPARISON:  CT chest abdomen pelvis 05/20/2022.    FINDINGS:  Thoracic soft tissues: Normal thyroid.    Aorta: Thoracic aorta is normal in caliber and contour with no significant calcific atherosclerosis.    Heart: Normal in size. No pericardial effusion. No significant calcific coronary atherosclerosis.    Vicky/Mediastinum: No significant mediastinal, hilar, or axillary lymphadenopathy    Lungs: Trachea and bronchi are patent.  Lungs are well aerated, without consolidation or pleural fluid.    Liver: Normal in size and contour.  No focal hepatic lesion.    Gallbladder: Contracted.    Bile Ducts: No evidence of dilated ducts.    Pancreas: No mass or peripancreatic fat stranding.    Spleen: Unremarkable.    Stomach and duodenum: Unremarkable.    Adrenals: Unremarkable.    Kidneys/ Ureters: Normal in size and location.  No hydronephrosis or nephrolithiasis. No ureteral dilatation.    Bladder: Bladder is markedly distended without evidence of wall thickening.    Reproductive organs: Unremarkable.    Bowel/Mesentery: Small bowel is normal in caliber with no evidence of obstruction.  No evidence of inflammation or wall thickening.  Colon demonstrates no focal wall thickening.    Lymph nodes: No lymphadenapathy.    Abdominal wall:  Unremarkable.    Vasculature: No aneurysm. No significant  calcific atherosclerosis.    Bones: No acute fracture. No suspicious osseous lesions.                                       CT Head Without Contrast (Final result)  Result time 06/07/22 02:50:01      Final result by Donny Estes MD (06/07/22 02:50:01)                   Impression:      No acute intracranial abnormalities      Electronically signed by: Donny Estes MD  Date:    06/07/2022  Time:    02:50               Narrative:    EXAMINATION:  CT HEAD WITHOUT CONTRAST    CLINICAL HISTORY:  Mental status change, unknown cause;    TECHNIQUE:  Low dose axial images were obtained through the head.  Coronal and sagittal reformations were also performed. Contrast was not administered.    COMPARISON:  05/20/2022.    FINDINGS:  The brain parenchyma appears normal for age with good corticomedullary differentiation.  There is no evidence of acute infarct, hemorrhage, or mass.  The ventricular system is normal in size.  No mass-effect or midline shift.  There are no abnormal extra-axial fluid collections.  The paranasal sinuses and mastoid air cells are clear.  The calvarium appears intact.  .                                       CT Soft Tissue Neck WO Contrast (Final result)  Result time 06/07/22 03:25:10      Final result by Donny Estes MD (06/07/22 03:25:10)                   Impression:      No radiopaque foreign body identified.    No acute fracture or traumatic malalignment of the cervical spine.    Electronically signed by resident: Grey Abdi  Date:    06/07/2022  Time:    02:49    Electronically signed by: Donny Estes MD  Date:    06/07/2022  Time:    03:25               Narrative:    EXAMINATION:  CT SOFT TISSUE NECK WITHOUT CONTRAST    CLINICAL HISTORY:  Foreign body suspected, neck, neg xray;    TECHNIQUE:  Axial CT images obtained throughout the region of the neck without intravenous contrast. Axial, sagittal and coronal reconstructions were performed.    COMPARISON:  No  priors.    FINDINGS:  Examination mildly degraded by patient motion artifact.    No abnormal soft tissue injury within the neck. There is no evidence of pathologic lymph node enlargement.    The soft tissues of the nasopharynx, oropharynx, hypopharynx and larynx are within normal limits.  Tiny calcified tonsilliths incidentally noted on the right.  The parotid, submandibular, and thyroid glands demonstrate nothing unusual.  There is some calcification in the laryngeal cartilage.    Patency of major vessels of the neck unable to be assessed on this non contrasted examination.  Limited intracranial evaluation is limited by motion artifact.  Mild patchy opacification of the bilateral ethmoidal air cells.  Lobulated opacification of the inferior right maxillary sinus, likely mucosal retention cyst.  Mastoid air cells are clear.    Lung apices demonstrate no pneumothorax.  Please see concomitant CT of the chest for detailed evaluation.    No acute fracture or traumatic malalignment of the cervical spine.  No spinal canal stenosis.                                       X-Ray Chest AP Portable (Final result)  Result time 06/07/22 02:24:59      Final result by Donny Estes MD (06/07/22 02:24:59)                   Impression:      No acute findings in the chest.      Electronically signed by: Donny Estes MD  Date:    06/07/2022  Time:    02:24               Narrative:    EXAMINATION:  XR CHEST AP PORTABLE    CLINICAL HISTORY:  Chest pain, unspecified    TECHNIQUE:  Single frontal view of the chest was performed.    COMPARISON:  09/22/2021.    FINDINGS:  No consolidation, pleural effusion or pneumothorax.    Cardiomediastinal silhouette is unremarkable.                                          Assessment/Plan:     * LOC (loss of consciousness)  Found down unresponsive to pain  Mute for some time despite understanding and gesturing correctly  Urine very clear and SpGr 1.000- ?psychogenic polydipsia, Mute, ?undx  mental illness  Urinating but had bladder distended on imaging, q4h bladder scans ordered   No elicit drugs on tox screen, and denied use  Said he is a  but unclear how with sever deformity of hands- Early OA? RA? Xrays ordered. CRP.   Could have been a seizure, ordered EEG stat, please call to set up  Labs and imaging unremarkable otherwise  Chest Pain unlikely to be ACS at this age but could of had an arhythmia, tele monitor, consider loop  Gave toradol, bc I thought maybe CP was myocarditis or pleuritis/pericaditis, seems to of helped  Consider MRI   Very unusual case, more collateral required from family again and pt once he recovers more              VTE Risk Mitigation (From admission, onward)         Ordered     IP VTE LOW RISK PATIENT  Once         06/07/22 0446     Place sequential compression device  Until discontinued         06/07/22 0446                   Checo Eduardo MD  Department of Hospital Medicine   [facility] Dept

## 2022-06-07 NOTE — PLAN OF CARE
Problem: Adult Inpatient Plan of Care  Goal: Plan of Care Review  Outcome: Ongoing, Progressing  Goal: Patient-Specific Goal (Individualized)  Outcome: Ongoing, Progressing  Goal: Absence of Hospital-Acquired Illness or Injury  Outcome: Ongoing, Progressing  Goal: Optimal Comfort and Wellbeing  Outcome: Ongoing, Progressing  Goal: Readiness for Transition of Care  Outcome: Ongoing, Progressing     AAOx4. Dx: LOC. Wife found him unconscious at home. Now AAOx4. Kenyan speaking. Need . Drug screen neg. Cr 7.0. C/o headache. Administered tylenol, now awaiting effectiveness. Instructed to call for assistance

## 2022-06-07 NOTE — PT/OT/SLP EVAL
"Speech Language Pathology Evaluation  Bedside Swallow    Patient Name:  Jean Paul Mir   MRN:  [id number]  Admitting Diagnosis: LOC (loss of consciousness)    Recommendations:                 General Recommendations:  Dysphagia therapy  Diet recommendations:  Puree, Thin   Aspiration Precautions: 1 bite/sip at a time, Eliminate distractions, Feed only when awake/alert, HOB to 90 degrees, Meds crushed in puree, Monitor for s/s of aspiration, Remain upright 30 minutes post meal, Small bites/sips and Strict aspiration precautions   General Precautions: Standard, fall, aspiration, pureed diet  Communication strategies:  none    History:     No past medical history on file.    No past surgical history on file.    Prior Intubation HX:  None this admit    Modified Barium Swallow: none found on file    Chest X-Rays: 6/7: "Impression: No acute findings in the chest"    Prior diet: reg/thin    Subjective     Upon initial attempt, pt in ED 19 though RN reported he was currently moving to the hallway.   Upon second attempt, pt in hallway C. Pt was awake/alert. Session completed in Cook Islander interpretation with use of Exhibition A.     Pain/Comfort:  · Pain Rating 1: 0/10  · Pain Rating Post-Intervention 1: 0/10    Respiratory Status: Room air    Objective:     Oral Musculature Evaluation  · Oral Musculature: WFL  · Dentition: present and adequate  · Mucosal Quality: adequate  · Oral Labial Strength and Mobility: WFL  · Lingual Strength and Mobility: WFL  · Buccal Strength and Mobility: WFL  · Volitional Cough: strong  · Volitional Swallow: timely  · Voice Prior to PO Intake: clear    Bedside Swallow Eval:   Consistencies Assessed:  · Thin liquids - several straw sips thin liquids  · Puree - x5 tsp pudding  · Solids - x2 bites saltine cracker     Oral Phase:   · WFL    Pharyngeal Phase:   · Persistent globus sensation - pt localizing sensation on R side of neck and completed multiple spontaneous swallow/liquid rinses though " unsuccessful in clearing. Globus sensation eventually cleared with swallow of puree consistency  · No overt coughing/choking across all trials     Compensatory Strategies  · None    Education provided re: role of SLP, current impressions, current recs, safe swallow precautions, risk for aspiration, and SLP POC moving forward. Continue ST per POC.    Assessment:     Jean Paul Mir is a 31 y.o. male with an SLP diagnosis of Dysphagia.     Goals:   Multidisciplinary Problems     SLP Goals        Problem: SLP    Goal Priority Disciplines Outcome   SLP Goal     SLP Ongoing, Progressing   Description: Speech Language Pathology Goals  Goals expected to be met by 6/14:  1. Pt will participate in ongoing assessment of swallow function to determine safest and least restrictive diet.                      Plan:     · Patient to be seen:  4 x/week   · Plan of Care expires:  07/06/22  · Plan of Care reviewed with:  patient   · SLP Follow-Up:  Yes       Discharge recommendations:   (tbd)     Time Tracking:     SLP Treatment Date:   06/07/22  Speech Start Time:  1213  Speech Stop Time:  1234     Speech Total Time (min):  21 min    Billable Minutes: Eval Swallow and Oral Function 11 and Self Care/Home Management Training 10  06/07/2022

## 2022-06-07 NOTE — ASSESSMENT & PLAN NOTE
Found down unresponsive to pain  Mute for some time despite understanding and gesturing correctly  Urine very clear and SpGr 1.000- ?psychogenic polydipsia, Mute, ?undx mental illness  Urinating but had bladder distended on imaging, q4h bladder scans ordered   No elicit drugs on tox screen, and denied use  Said he is a  but unclear how with sever deformity of hands- Early OA? RA? Xrays ordered. CRP.   Could have been a seizure, ordered EEG stat, please call to set up  Labs and imaging unremarkable otherwise  Chest Pain unlikely to be ACS at this age but could of had an arhythmia, tele monitor, consider loop  Very unusual case, more collateral required from family and pt once he recovers more  Consider MRI

## 2022-06-07 NOTE — HPI
Jean Paul Mir is a 31 y.o. male who has no past medical history on file, presented to the ED after being found down unresponsive to pain by EMS. Patient was unable to talk, mute, he was using his hands to gesture correctly but was unable to speak to being. Patients tox screen was negative. . Denies any elicit drug use or ingestion of anything. Does not remember the event. He only complains of L chest discomfort that extends to his L shoulder, and LN soreness b/l of throat. Denies known medical conditions or daily meds. He is able to talk after some time. No known hx of seizures. Wife is also [place] speaking, ED talked to her but was unable to get anything useful for diagnosis as of yet. 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 Patient underwent CTH, CT neck, and CT a/c/p, which we are largely unremarkable as well as his labs. He did have enlarged bladder but was urinating. And his specific gravity was very low, his urine looked as clear as water.

## 2022-06-07 NOTE — ED PROVIDER NOTES
Encounter Date: 6/7/2022       History     Chief Complaint   Patient presents with    Altered Mental Status     Found at home, unresponsive to pain upon EMS arrival, 1 mg IV narcan given without response. Upon arrival to ED, pt responsive to voice but no verbal response. Ambulated with assistance with EMS. Per family, no ETOH     Patient is a previously healthy 31-year-old male presenting to the emergency department for altered mental status.  Per EMS, he has been alert the entire time, but has not been verbally responsive.  He was given 1 of Narcan on route without any change in presentation.  The patient's wife was later called for collateral information.  She states that he has had 1 prior episode similar to this some years ago, and was admitted to the hospital due to having air in his brain. Prior today, the patient had been in his normal state of health. He is normally able to speak without difficulty. Further history is limited secondary to patient's altered state.    The history is provided by the EMS personnel and the spouse. The history is limited by a language barrier. A  was used (Mandeep Rico DO).     Review of patient's allergies indicates:  No Known Allergies  No past medical history on file.  No past surgical history on file.  No family history on file.  Social History     Tobacco Use    Smoking status: Never Smoker    Smokeless tobacco: Never Used   Substance Use Topics    Alcohol use: Not Currently    Drug use: Never     Review of Systems   Unable to perform ROS: Mental status change       Physical Exam     Initial Vitals   BP Pulse Resp Temp SpO2   06/07/22 0111 06/07/22 0111 06/07/22 0111 06/07/22 0112 06/07/22 0111   135/78 63 16 98.6 °F (37 °C) 98 %      MAP       --                Physical Exam    Nursing note and vitals reviewed.  Constitutional: He appears well-developed and well-nourished. He is not diaphoretic. No distress.   Mildly diaphoretic. Alert, but  nonverbal. Gesturing, nodding/shaking head to yes/no questions.   HENT:   Head: Normocephalic and atraumatic.   Right Ear: External ear normal.   Left Ear: External ear normal.   No obvious signs of trauma.   Eyes:   Pupils 4 mm/equally reactive   Neck: Neck supple.   Cardiovascular: Normal rate, regular rhythm, normal heart sounds and intact distal pulses.   Pulmonary/Chest: Breath sounds normal. No respiratory distress. He has no wheezes. He has no rhonchi. He has no rales.   Left-sided chest wall tenderness to palpation.    Abdominal: Abdomen is soft. He exhibits no distension. There is abdominal tenderness in the right lower quadrant, suprapubic area and left lower quadrant.   Diffuse lower abdominal TTP There is no rebound and no guarding.   Musculoskeletal:      Cervical back: Neck supple.     Neurological: He is alert and oriented to person, place, and time. GCS score is 15. GCS eye subscore is 4. GCS verbal subscore is 5. GCS motor subscore is 6.   Selectively follows commands, but moves all extremities spontaneously.   Skin: Skin is warm. Capillary refill takes less than 2 seconds. No rash noted.   Psychiatric: He has a normal mood and affect.         ED Course   Procedures  Labs Reviewed   COMPREHENSIVE METABOLIC PANEL - Abnormal; Notable for the following components:       Result Value    CO2 20 (*)     All other components within normal limits    Narrative:     add on tsh per dr dylan nair/order#[id number] @02:32 06/07/2022              Release to patient->Immediate   URINALYSIS, REFLEX TO URINE CULTURE - Abnormal; Notable for the following components:    Color, UA Colorless (*)     Specific [place], UA 1.000 (*)     All other components within normal limits    Narrative:     Specimen Source->Urine   DRUG SCREEN PANEL, URINE EMERGENCY - Abnormal; Notable for the following components:    Creatinine, Urine 7.0 (*)     All other components within normal limits    Narrative:     Specimen Source->Urine    CK - Abnormal; Notable for the following components:     (*)     All other components within normal limits    Narrative:     add on ck per dr preston madera/order#[id number] @04;51 06/07/2022      add on tsh per dr dylan nair/order#[id number] @02:32 06/07/2022/  Add on crp per preston bell/order#[id number] @04:42 06/07/2022              Release to patient->Immediate   HIV 1 / 2 ANTIBODY    Narrative:     add on ck per dr preston madera/order#[id number] @04;51 06/07/2022      add on tsh per dr dylan nair/order#[id number] @02:32 06/07/2022/  Add on crp per preston bell/order#[id number] @04:42 06/07/2022              Release to patient->Immediate   HEPATITIS C ANTIBODY    Narrative:     add on ck per dr preston madera/order#[id number] @04;51 06/07/2022      add on tsh per dr dylan nair/order#[id number] @02:32 06/07/2022/  Add on crp per preston bell/order#[id number] @04:42 06/07/2022              Release to patient->Immediate   CBC W/ AUTO DIFFERENTIAL    Narrative:     Release to patient->Immediate   TROPONIN I   TSH   TSH    Narrative:     add on tsh per dr dylan nair/order#[id number] @02:32 06/07/2022              Release to patient->Immediate   C-REACTIVE PROTEIN   C-REACTIVE PROTEIN    Narrative:     add on ck per dr preston madera/order#[id number] @04;51 06/07/2022      add on tsh per dr dylan nair/order#[id number] @02:32 06/07/2022/  Add on crp per preston bell/order#[id number] @04:42 06/07/2022              Release to patient->Immediate   CK   HETEROPHILE AB SCREEN   SARS-COV-2 RDRP GENE    Narrative:     This test utilizes isothermal nucleic acid amplification   technology to detect the SARS-CoV-2 RdRp nucleic acid segment.   The analytical sensitivity (limit of detection) is 125 genome   equivalents/mL.   A POSITIVE result implies infection with the SARS-CoV-2 virus;   the patient is presumed to be contagious.     A NEGATIVE result means that SARS-CoV-2 nucleic acids are not   present above the limit of detection. A NEGATIVE  result should be   treated as presumptive. It does not rule out the possibility of   COVID-19 and should not be the sole basis for treatment decisions.   If COVID-19 is strongly suspected based on clinical and exposure   history, re-testing using an alternate molecular assay should be   considered.   This test is only for use under the Food and Drug   Administration s Emergency Use Authorization (EUA).   Commercial kits are provided by [email or web address].   Performance characteristics of the EUA have been independently   verified by [facility] Department of   Pathology and Laboratory Medicine.   _________________________________________________________________   The authorized Fact Sheet for Healthcare Providers and the authorized Fact   Sheet for Patients of the ID NOW COVID-19 are available on the FDA   website:     https://www.fda.gov/media/206578/download  https://www.fda.gov/media/045530/download   POCT GLUCOSE MONITORING CONTINUOUS     EKG Readings: (Independently Interpreted)   Initial Reading: No STEMI. Previous EKG: Compared with most recent EKG Rhythm: Sinus Bradycardia. Heart Rate: 53. Ectopy: No Ectopy. Conduction: Normal.   No prior EKGs for comparison     ECG Results          EKG 12-lead (Final result)  Result time 06/07/22 11:30:16    Final result by Interface, Lab In [facility] (06/07/22 11:30:16)                 Narrative:    Test Reason : R41.82,    Vent. Rate : 053 BPM     Atrial Rate : 053 BPM     P-R Int : 152 ms          QRS Dur : 088 ms      QT Int : 436 ms       P-R-T Axes : 076 074 059 degrees     QTc Int : 409 ms    Sinus bradycardia  Otherwise normal ECG  No previous ECGs available  Confirmed by Jack RIBEIRO MD ([id number]) on 6/7/2022 11:30:04 AM    Referred By: [email or web address]   SELF           Confirmed By:Jack RIBEIRO MD                            Imaging Results          X-Ray Hand 3 View Bilateral (Final result)  Result time 06/07/22 06:02:15   Procedure changed from X-Ray Hand 2 View  Bilat     Final result by Koffi Brewer MD (06/07/22 06:02:15)                 Impression:      No definite radiographic evidence of acute displaced fracture or dislocation.  No aggressive periarticular erosive changes or severe joint space narrowing.  Further evaluation and follow-up as warranted in this patient with questioned inflammatory arthropathy.      Electronically signed by: Koffi Brewer MD  Date:    06/07/2022  Time:    06:02             Narrative:    EXAMINATION:  XR HAND COMPLETE 3 VIEWS BILATERAL    CLINICAL HISTORY:  Severe OA vs RA?;.    TECHNIQUE:  PA, lateral, and oblique views of both hands were performed.    COMPARISON:  None    FINDINGS:  Note evaluation is limited secondary to suboptimal positioning of the hands on the AP and oblique views.  Additionally, monitoring leads overlie the 3rd digit of the right hand and limits evaluation of the phalanges on the lateral view.    No definite acute displaced fracture appreciated of the right or left hands.  No britney dislocation appreciated allowing for limited positioning, particularly fixed flexion of the right hand at the level of the PIP joints.  No periarticular erosive changes or pencil in cup deformity appreciated to suggest an inflammatory arthropathy.  No definite retained foreign bodies appreciated within the soft hands.                               CT Chest Abdomen Pelvis Without Contrast (XPD) (Final result)  Result time 06/07/22 03:31:41    Final result by Donny Estes MD (06/07/22 03:31:41)                 Impression:      Marked distension of the urinary bladder.  Consider catheterization.    Otherwise, no acute abnormality within the chest, abdomen, or pelvis.    Electronically signed by resident: Grey Abdi  Date:    06/07/2022  Time:    03:11    Electronically signed by: Donny Estes MD  Date:    06/07/2022  Time:    03:31             Narrative:    EXAMINATION:  CT CHEST ABDOMEN PELVIS WITHOUT  CONTRAST(XPD)    CLINICAL HISTORY:  Polytrauma, blunt;    TECHNIQUE:  Axial images of the chest, abdomen, and pelvis were acquired without IV contrast. Oral contrast was not administered.  Coronal and sagittal reconstructions were also obtained    COMPARISON:  CT chest abdomen pelvis 05/20/2022.    FINDINGS:  Thoracic soft tissues: Normal thyroid.    Aorta: Thoracic aorta is normal in caliber and contour with no significant calcific atherosclerosis.    Heart: Normal in size. No pericardial effusion. No significant calcific coronary atherosclerosis.    Vicky/Mediastinum: No significant mediastinal, hilar, or axillary lymphadenopathy    Lungs: Trachea and bronchi are patent.  Lungs are well aerated, without consolidation or pleural fluid.    Liver: Normal in size and contour.  No focal hepatic lesion.    Gallbladder: Contracted.    Bile Ducts: No evidence of dilated ducts.    Pancreas: No mass or peripancreatic fat stranding.    Spleen: Unremarkable.    Stomach and duodenum: Unremarkable.    Adrenals: Unremarkable.    Kidneys/ Ureters: Normal in size and location.  No hydronephrosis or nephrolithiasis. No ureteral dilatation.    Bladder: Bladder is markedly distended without evidence of wall thickening.    Reproductive organs: Unremarkable.    Bowel/Mesentery: Small bowel is normal in caliber with no evidence of obstruction.  No evidence of inflammation or wall thickening.  Colon demonstrates no focal wall thickening.    Lymph nodes: No lymphadenapathy.    Abdominal wall:  Unremarkable.    Vasculature: No aneurysm. No significant calcific atherosclerosis.    Bones: No acute fracture. No suspicious osseous lesions.                               CT Head Without Contrast (Final result)  Result time 06/07/22 02:50:01    Final result by Donny Estes MD (06/07/22 02:50:01)                 Impression:      No acute intracranial abnormalities      Electronically signed by: Donny Estes  MD  Date:    06/07/2022  Time:    02:50             Narrative:    EXAMINATION:  CT HEAD WITHOUT CONTRAST    CLINICAL HISTORY:  Mental status change, unknown cause;    TECHNIQUE:  Low dose axial images were obtained through the head.  Coronal and sagittal reformations were also performed. Contrast was not administered.    COMPARISON:  05/20/2022.    FINDINGS:  The brain parenchyma appears normal for age with good corticomedullary differentiation.  There is no evidence of acute infarct, hemorrhage, or mass.  The ventricular system is normal in size.  No mass-effect or midline shift.  There are no abnormal extra-axial fluid collections.  The paranasal sinuses and mastoid air cells are clear.  The calvarium appears intact.  .                               CT Soft Tissue Neck WO Contrast (Final result)  Result time 06/07/22 03:25:10    Final result by Donny Estes MD (06/07/22 03:25:10)                 Impression:      No radiopaque foreign body identified.    No acute fracture or traumatic malalignment of the cervical spine.    Electronically signed by resident: Grey Abdi  Date:    06/07/2022  Time:    02:49    Electronically signed by: Donny Estes MD  Date:    06/07/2022  Time:    03:25             Narrative:    EXAMINATION:  CT SOFT TISSUE NECK WITHOUT CONTRAST    CLINICAL HISTORY:  Foreign body suspected, neck, neg xray;    TECHNIQUE:  Axial CT images obtained throughout the region of the neck without intravenous contrast. Axial, sagittal and coronal reconstructions were performed.    COMPARISON:  No priors.    FINDINGS:  Examination mildly degraded by patient motion artifact.    No abnormal soft tissue injury within the neck. There is no evidence of pathologic lymph node enlargement.    The soft tissues of the nasopharynx, oropharynx, hypopharynx and larynx are within normal limits.  Tiny calcified tonsilliths incidentally noted on the right.  The parotid, submandibular, and thyroid glands demonstrate  nothing unusual.  There is some calcification in the laryngeal cartilage.    Patency of major vessels of the neck unable to be assessed on this non contrasted examination.  Limited intracranial evaluation is limited by motion artifact.  Mild patchy opacification of the bilateral ethmoidal air cells.  Lobulated opacification of the inferior right maxillary sinus, likely mucosal retention cyst.  Mastoid air cells are clear.    Lung apices demonstrate no pneumothorax.  Please see concomitant CT of the chest for detailed evaluation.    No acute fracture or traumatic malalignment of the cervical spine.  No spinal canal stenosis.                               X-Ray Chest AP Portable (Final result)  Result time 06/07/22 02:24:59    Final result by Donny Estes MD (06/07/22 02:24:59)                 Impression:      No acute findings in the chest.      Electronically signed by: Donny Estes MD  Date:    06/07/2022  Time:    02:24             Narrative:    EXAMINATION:  XR CHEST AP PORTABLE    CLINICAL HISTORY:  Chest pain, unspecified    TECHNIQUE:  Single frontal view of the chest was performed.    COMPARISON:  09/22/2021.    FINDINGS:  No consolidation, pleural effusion or pneumothorax.    Cardiomediastinal silhouette is unremarkable.                              X-Rays:   Independently Interpreted Readings:   Chest X-Ray: Normal heart size.  No infiltrates.  No acute abnormalities.     Medications   polyethylene glycol packet 17 g (has no administration in time range)   acetaminophen tablet 650 mg (650 mg Oral Given 6/7/22 1830)   naloxone 0.4 mg/mL injection 0.02 mg (has no administration in time range)   glucose chewable tablet 16 g (has no administration in time range)   glucose chewable tablet 24 g (has no administration in time range)   glucagon (human recombinant) injection 1 mg (has no administration in time range)   ondansetron injection 4 mg (has no administration in time range)   ketorolac injection 30  mg (30 mg Intravenous Given 6/7/22 0547)     Medical Decision Making:   History:   I obtained history from: someone other than patient.  Old Medical Records: I decided to obtain old medical records.  Initial Assessment:   Emergent evaluation of altered mental status. He is afebrile and hemodynamically stable.  Differential Diagnosis:   ICH, cervical fracture/dislocation, substance/medication induced encephalopathy, metabolic encephalopathy/electrolyte abnormality, ACS vs traumatic chest pain, infectious process  Clinical Tests:   Lab Tests: Ordered and Reviewed  Radiological Study: Ordered and Reviewed  Medical Tests: Ordered and Reviewed  ED Management:  Labs and pan scan imaging are unremarkable for any obvious etiology for his encephalopathy. UDS and UA negative. Additionally troponin and EKG normal lowering suspicion for ACS. Discussed case with hospital medicine who will admit patient to their service for undifferentiated encephalopathy.            Attending Attestation:   Physician Attestation Statement for Resident:  As the supervising MD   Physician Attestation Statement: I have personally seen and examined this patient.   I agree with the above history. -:   As the supervising MD I agree with the above PE.    As the supervising MD I agree with the above treatment, course, plan, and disposition.   -:  31-year-old male with unknown past medical history brought in for altered mental status.  On exam patient is alert, opens mouth to answer questions but makes no sound.  Points to throat, left chest, and lower abdomen when asked about pain, and he is TTP (grimacing) in these regions as well. No trauma, no rashes or ecchymoses, CTAB, RRR no mrg, abd soft/ND but diffusely TTP and left CW TTP as well, moving all extremities with normal strength and no facial droop, pulses intact. As patient is poor historian, will obtain imaging for further evaluation. I do not suspect viral encephalitis as pt afebrile and nontoxic  appearing, and family denies recent illness.   I have reviewed and agree with the residents interpretation of the following: CT scans, lab data and EKG.                ED Course as of 06/07/22 1955 Tue Jun 07, 2022   0308 Comprehensive metabolic panel(!) [JR]   0308 Troponin I [JR]   0308 CBC auto differential [JR]      ED Course User Index  [JR] Denise Cervantes MD             Clinical Impression:   Final diagnoses:  [R41.82] Altered mental status  [R07.9] Chest pain          ED Disposition Condition    Observation               Mandeep Rico MD  Resident  06/07/22 0545       Denise Cervantes MD  06/07/22 1955

## 2022-06-08 VITALS
SYSTOLIC BLOOD PRESSURE: 110 MMHG | TEMPERATURE: 98 F | OXYGEN SATURATION: 97 % | HEIGHT: 64 IN | WEIGHT: 127.88 LBS | HEART RATE: 52 BPM | DIASTOLIC BLOOD PRESSURE: 65 MMHG | RESPIRATION RATE: 18 BRPM | BODY MASS INDEX: 21.83 KG/M2

## 2022-06-08 LAB — POCT GLUCOSE: 100 MG/DL (ref 70–110)

## 2022-06-08 PROCEDURE — 25000003 PHARM REV CODE 250: Performed by: STUDENT IN AN ORGANIZED HEALTH CARE EDUCATION/TRAINING PROGRAM

## 2022-06-08 PROCEDURE — 92526 ORAL FUNCTION THERAPY: CPT

## 2022-06-08 PROCEDURE — 97535 SELF CARE MNGMENT TRAINING: CPT

## 2022-06-08 PROCEDURE — G0378 HOSPITAL OBSERVATION PER HR: HCPCS

## 2022-06-08 PROCEDURE — 25000003 PHARM REV CODE 250: Performed by: HOSPITALIST

## 2022-06-08 PROCEDURE — 99217 PR OBSERVATION CARE DISCHARGE: ICD-10-PCS | Mod: ,,, | Performed by: HOSPITALIST

## 2022-06-08 PROCEDURE — 99217 PR OBSERVATION CARE DISCHARGE: CPT | Mod: ,,, | Performed by: HOSPITALIST

## 2022-06-08 RX ORDER — LIDOCAINE 50 MG/G
1 PATCH TOPICAL
Status: DISCONTINUED | OUTPATIENT
Start: 2022-06-08 | End: 2022-06-08 | Stop reason: HOSPADM

## 2022-06-08 RX ORDER — OXYCODONE HYDROCHLORIDE 5 MG/1
5 TABLET ORAL ONCE
Status: DISCONTINUED | OUTPATIENT
Start: 2022-06-08 | End: 2022-06-08 | Stop reason: HOSPADM

## 2022-06-08 RX ORDER — POLYETHYLENE GLYCOL 3350 17 G/17G
17 POWDER, FOR SOLUTION ORAL DAILY
Status: DISCONTINUED | OUTPATIENT
Start: 2022-06-08 | End: 2022-06-08 | Stop reason: HOSPADM

## 2022-06-08 RX ORDER — AMOXICILLIN 250 MG
1 CAPSULE ORAL 2 TIMES DAILY
Status: DISCONTINUED | OUTPATIENT
Start: 2022-06-08 | End: 2022-06-08 | Stop reason: HOSPADM

## 2022-06-08 RX ADMIN — LIDOCAINE 1 PATCH: 50 PATCH CUTANEOUS at 11:06

## 2022-06-08 RX ADMIN — POLYETHYLENE GLYCOL 3350 17 G: 17 POWDER, FOR SOLUTION ORAL at 10:06

## 2022-06-08 RX ADMIN — SENNOSIDES AND DOCUSATE SODIUM 1 TABLET: 50; 8.6 TABLET ORAL at 11:06

## 2022-06-08 RX ADMIN — ACETAMINOPHEN 650 MG: 325 TABLET ORAL at 10:06

## 2022-06-08 NOTE — PROCEDURES
Routine EEG Report    Jean Paul Mir  [id number]  1990    DATE OF SERVICE: 6/7/2022  REASON FOR CONSULT:  31-year-old man found down.  Evaluate for evidence of epileptiform activity.    METHODOLOGY   Electroencephalographic (EEG) recording is with electrodes placed according to the International 10-20 placement system.  Thirty two (32) channels of digital signal (sampling rate of 512/sec) including T1 and T2 was simultaneously recorded from the scalp and may include  EKG, EMG, and/or eye monitors.  Recording band pass was 0.1 to 512 hz.  Digital video recording of the patient is simultaneously recorded with the EEG.  The patient is instructed report clinical symptoms which may occur during the recording session.  EEG and video recording is stored and archived in digital format. Activation procedures which include photic stimulation, hyperventilation and instructing patients to perform simple task are done in selected patients.    The EEG is displayed on a monitor screen and can be reviewed using different montages.  Computer assisted analysis is employed to detect spike and electrographic seizure activity.   The entire record is submitted for computer analysis.  The entire recording is visually reviewed and the times identified by computer analysis as being spikes or seizures are reviewed again.  Compresses spectral analysis (CSA) is also performed on the activity recorded from each individual channel.  This is displayed as a power display of frequencies from 0 to 30 Hz over time.   The CSA is reviewed looking for asymmetries in power between homologous areas of the scalp and then compared with the original EEG recording.     [employer] software is also utilized in the review of this study.  This software suite analyzes the EEG recording in multiple domains.  Coherence and rhythmicity is computed to identify EEG sections which may contain organized seizures.  Each channel undergoes analysis to detect  presence of spike and sharp waves which have special and morphological characteristic of epileptic activity.  The routine EEG recording is converted from spacial into frequency domain.  This is then displayed comparing homologous areas to identify areas of significant asymmetry.  Algorithm to identify non-cortically generated artifact is used to separate eye movement, EMG and other artifact from the EEG.      EEG FINDINGS  Background activity:   The waking background is continuous and symmetric with a well-formed 10 hz posterior dominant rhythm seen bilaterally.    Sleep:  Sleep is not captured during this recording session    Activation procedures:   The patient is able to follow simple commands and answers orientation questions correctly.     Cardiac Monitor:   Heart rate appears generally regular on a single lead EKG.    Impression:   This is a normal awake routine EEG.  There are no prominent focal findings, no epileptiform discharges, and no electrographic seizures.   Of note, a normal EEG does not rule out the diagnosis of epilepsy.  Clinical correlation is advised.    Meche Menjivar MD PhD  Neurology-Epilepsy  [facility].

## 2022-06-08 NOTE — PROGRESS NOTES
Pt discharged to home with all belongings in stable condition. Discharge paper given and discussed.

## 2022-06-08 NOTE — PT/OT/SLP PROGRESS
"Speech Language Pathology Treatment    Patient Name:  Jean Paul Mir   MRN:  [id number]  Admitting Diagnosis: LOC (loss of consciousness)    Recommendations:                 General Recommendations:  Dysphagia therapy, diet check  Diet recommendations:  Regular, Liquid Diet Level: Thin   Aspiration Precautions:  , 1 bite/sip at a time, Alternating bites/sips, HOB to 90 degrees, Small bites/sips and Standard aspiration precautions   General Precautions: Standard, fall  Communication strategies:   needed     Subjective     Pt awake/alert sitting upright ion edge of bed eating breakfast. Alanna  #[id number] used during today's session. Pt agreeable to diet advancement trials.      Pain/Comfort:  · Pain Rating 1: 0/10    Respiratory Status: Room air    Objective:     Has the patient been evaluated by SLP for swallowing?   Yes  Keep patient NPO? No     Pt seen for ongoing dysphagia therapy. He continues to report globus sensation on his right side of throat, though no pain reported. It did not appear to affect his ability to swallow. Pt was able to tolerate 4 oz of thin liquids from straw and regular solids with cracker x2 without any overt s/sx of airway compromise. He reported the cracker feeling "stuck". Attempted liquids wash, puree wash and head turn to alleviate globus sensation. He kept reporting that it was "good" after the swallow, then proceed to push on the right side of his throat. Overall, no overt s/sx of airway compromise and would recommend he be advanced to regular solids and thin liquids. Pt in agreement.     Educated pt on role of SLP, diet recs, aspiration precautions, clearance strategies and ST POC. He verbalized understanding and was in agreement.     Assessment:     Jean Paul Mir is a 31 y.o. male with an SLP diagnosis of a largely functional oropharyngeal swallow.  He presents with ongoing globus sensation at the right side of the " throat, improved with use of strategies.    Goals:   Multidisciplinary Problems     SLP Goals        Problem: SLP    Goal Priority Disciplines Outcome   SLP Goal     SLP Ongoing, Progressing   Description: Speech Language Pathology Goals  Goals expected to be met by 6/14:  1. Pt will participate in ongoing assessment of swallow function to determine safest and least restrictive diet.                      Plan:     · Patient to be seen:  3 x/week   · Plan of Care expires:  07/06/22  · Plan of Care reviewed with:  patient   · SLP Follow-Up:  Yes       Discharge recommendations:  home   Barriers to Discharge:  None    Time Tracking:     SLP Treatment Date:   06/08/22  Speech Start Time:  0824  Speech Stop Time:  0843     Speech Total Time (min):  19 min    Billable Minutes: Treatment Swallowing Dysfunction 10 and Self Care/Home Management Training 9    06/08/2022

## 2022-06-08 NOTE — PLAN OF CARE
[facility] - Intensive Care ([facility])  Discharge Final Note    Primary Care Provider: Primary Doctor No    Expected Discharge Date: 6/8/2022    Pt was d/c prior to initial assessment. Pt to d/c home with no needs.    SW arranged Lyft ride for pt with the assistance of bedside nurse. Lyft ride successfully picked up pt and transported pt home    Final Discharge Note (most recent)       Final Note - 06/08/22 1437          Final Note    Assessment Type Final Discharge Note (P)      Anticipated Discharge Disposition Home or Self Care (P)         Post-Acute Status    Discharge Delays None known at this time (P)                    Abigail Fair, CARMENZA, MSW, LMSW, RSW   Case Management  [facility]  Email: [email or web address]    Important Message from Medicare             Contact Info       No, Primary Doctor   Relationship: PCP - General        Next Steps: Follow up
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
## 2024-12-05 ENCOUNTER — HOSPITAL ENCOUNTER (EMERGENCY)
Facility: HOSPITAL | Age: 34
Discharge: HOME | End: 2024-12-05
Attending: EMERGENCY MEDICINE
Payer: COMMERCIAL

## 2024-12-05 VITALS
SYSTOLIC BLOOD PRESSURE: 116 MMHG | OXYGEN SATURATION: 97 % | HEART RATE: 91 BPM | WEIGHT: 130 LBS | RESPIRATION RATE: 16 BRPM | BODY MASS INDEX: 22.31 KG/M2 | DIASTOLIC BLOOD PRESSURE: 77 MMHG | TEMPERATURE: 99 F
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
## 2024-12-05 DIAGNOSIS — S46.919A STRAIN OF SHOULDER, UNSPECIFIED LATERALITY, INITIAL ENCOUNTER: ICD-10-CM

## 2024-12-05 DIAGNOSIS — S16.1XXA STRAIN OF NECK MUSCLE, INITIAL ENCOUNTER: ICD-10-CM

## 2024-12-05 DIAGNOSIS — S39.012A STRAIN OF LUMBAR REGION, INITIAL ENCOUNTER: ICD-10-CM

## 2024-12-05 DIAGNOSIS — S00.03XA CONTUSION OF SCALP, INITIAL ENCOUNTER: ICD-10-CM

## 2024-12-05 LAB
BILIRUB UR QL STRIP: NEGATIVE
CLARITY UR: CLEAR
COLOR UR: YELLOW
GLUCOSE UR QL STRIP: NEGATIVE
HGB UR QL STRIP: NEGATIVE
KETONES UR QL STRIP: NEGATIVE
LEUKOCYTE ESTERASE UR QL STRIP: NEGATIVE
NITRITE UR QL STRIP: NEGATIVE
PH UR STRIP: 7 [PH] (ref 5–8)
PROT UR QL STRIP: NEGATIVE
SP GR UR STRIP: 1.01 (ref 1–1.03)
URN SPEC COLLECT METH UR: NORMAL
UROBILINOGEN UR STRIP-ACNC: NEGATIVE EU/DL

## 2024-12-05 PROCEDURE — 25000003 PHARM REV CODE 250: Performed by: PHYSICIAN ASSISTANT

## 2024-12-05 PROCEDURE — 81003 URINALYSIS AUTO W/O SCOPE: CPT | Performed by: PHYSICIAN ASSISTANT

## 2024-12-05 PROCEDURE — 99285 EMERGENCY DEPT VISIT HI MDM: CPT | Mod: 25

## 2024-12-05 RX ORDER — ACETAMINOPHEN 500 MG
1000 TABLET ORAL
Status: COMPLETED | OUTPATIENT
Start: 2024-12-05 | End: 2024-12-05

## 2024-12-05 RX ORDER — METHOCARBAMOL 500 MG/1
500 TABLET, FILM COATED ORAL 3 TIMES DAILY
Qty: 15 TABLET | Refills: 0 | Status: SHIPPED | OUTPATIENT
Start: 2024-12-05 | End: 2024-12-10

## 2024-12-05 RX ORDER — LIDOCAINE 50 MG/G
1 PATCH TOPICAL DAILY
Qty: 5 PATCH | Refills: 0 | Status: SHIPPED | OUTPATIENT
Start: 2024-12-05

## 2024-12-05 RX ORDER — NAPROXEN 500 MG/1
500 TABLET ORAL 2 TIMES DAILY WITH MEALS
Qty: 30 TABLET | Refills: 0 | Status: SHIPPED | OUTPATIENT
Start: 2024-12-05

## 2024-12-05 RX ADMIN — ACETAMINOPHEN 1000 MG: 500 TABLET ORAL at 12:12

## 2024-12-05 NOTE — DISCHARGE INSTRUCTIONS
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
## 2024-12-05 NOTE — ED NOTES
While waiting, pt began coughing and c/o neck pain and ABD pain. Pt placed in C-collar and  Meli [id number] used.
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
